# Patient Record
Sex: MALE | Race: WHITE | NOT HISPANIC OR LATINO | Employment: UNEMPLOYED | URBAN - NONMETROPOLITAN AREA
[De-identification: names, ages, dates, MRNs, and addresses within clinical notes are randomized per-mention and may not be internally consistent; named-entity substitution may affect disease eponyms.]

---

## 2019-01-04 ENCOUNTER — OFFICE VISIT (OUTPATIENT)
Dept: FAMILY MEDICINE CLINIC | Facility: CLINIC | Age: 19
End: 2019-01-04

## 2019-01-04 VITALS
SYSTOLIC BLOOD PRESSURE: 124 MMHG | BODY MASS INDEX: 21.5 KG/M2 | HEIGHT: 67 IN | WEIGHT: 137 LBS | RESPIRATION RATE: 14 BRPM | OXYGEN SATURATION: 98 % | HEART RATE: 78 BPM | DIASTOLIC BLOOD PRESSURE: 80 MMHG

## 2019-01-04 DIAGNOSIS — Z23 NEED FOR VACCINATION: ICD-10-CM

## 2019-01-04 DIAGNOSIS — L70.0 ACNE VULGARIS: ICD-10-CM

## 2019-01-04 DIAGNOSIS — Z00.00 ROUTINE GENERAL MEDICAL EXAMINATION AT A HEALTH CARE FACILITY: Primary | ICD-10-CM

## 2019-01-04 DIAGNOSIS — F98.8 ATTENTION DEFICIT DISORDER (ADD) IN ADULT: ICD-10-CM

## 2019-01-04 PROCEDURE — 99385 PREV VISIT NEW AGE 18-39: CPT | Performed by: FAMILY MEDICINE

## 2019-01-04 PROCEDURE — 90471 IMMUNIZATION ADMIN: CPT | Performed by: FAMILY MEDICINE

## 2019-01-04 PROCEDURE — 90686 IIV4 VACC NO PRSV 0.5 ML IM: CPT | Performed by: FAMILY MEDICINE

## 2019-01-04 RX ORDER — BUPROPION HYDROCHLORIDE 150 MG/1
150 TABLET ORAL EVERY MORNING
Qty: 30 TABLET | Refills: 5 | Status: SHIPPED | OUTPATIENT
Start: 2019-01-04 | End: 2019-05-22 | Stop reason: SDUPTHER

## 2019-01-04 RX ORDER — BUPROPION HYDROCHLORIDE 150 MG/1
TABLET ORAL
Refills: 1 | COMMUNITY
Start: 2018-12-11 | End: 2019-01-04 | Stop reason: SDUPTHER

## 2019-01-04 RX ORDER — ADAPALENE 3 MG/G
GEL TOPICAL NIGHTLY
Qty: 45 G | Refills: 5 | Status: SHIPPED | OUTPATIENT
Start: 2019-01-04 | End: 2020-01-07

## 2019-01-04 NOTE — PROGRESS NOTES
New Patient History and Physical      Referring Physician: No ref. provider found    Chief Complaint:    Chief Complaint   Patient presents with   • Establish Care     no current issues need to establish PCP       History of Present Illness:     Patient is a 18-year-old male who is here to establish with a new primary care provider.    Last labs approx 6M ago.  Without known abnml.    Needs refills on several meds for acne.  Previous regimen provided significant improvement to his symptoms.    Also needs refill on bupropion; takes for ADD.  Denies any palpitations, chest pain or dyspnea on exertion.  Sleeping well, good appetite.  Denies any significant weight changes.        Subjective     Review of Systems     1. Constitutional: Negative for fever. Negative for chills, diaphoresis, fatigue and unexpected weight change.   2. HENT: No dysphagia; no changes to vision/hearing/smell/taste; no epistaxis  3. Eyes: Negative for redness and visual disturbance.   4. Respiratory: negative for shortness of breath. Negative for chest pain . Negative for cough and chest tightness.   5. Cardiovascular: Negative for chest pain and palpitations.   6. Gastrointestinal: Negative for abdominal distention, abdominal pain and blood in stool.   7. Endocrine: Negative for cold intolerance and heat intolerance.   8. Genitourinary: Negative for difficulty urinating, dysuria and frequency.   9. Musculoskeletal: Negative for arthralgias, back pain and myalgias.   10. Skin: Acne  11. Neurological: Negative for syncope, weakness and headaches.   12. Hematological: Negative for adenopathy. Does not bruise/bleed easily.   13. Psychiatric/Behavioral: Negative for confusion. The patient is not nervous/anxious.     The following portions of the patient's history were reviewed and updated as appropriate: allergies, current medications, past family history, past medical history, past social history, past surgical history and problem  "list.    Past Medical History: History reviewed. No pertinent past medical history.    Past Surgical History:  Past Surgical History:   Procedure Laterality Date   • TONSILLECTOMY         Family History: family history is not on file. He was adopted.    Social History:  reports that  has never smoked. he has never used smokeless tobacco. He reports that he does not drink alcohol or use drugs.    Medications:    Current Outpatient Medications:   •  buPROPion XL (WELLBUTRIN XL) 150 MG 24 hr tablet, Take 1 tablet by mouth Every Morning., Disp: 30 tablet, Rfl: 5  •  Multiple Vitamin (MULTI-VITAMIN DAILY PO), Take  by mouth., Disp: , Rfl:   •  adapalene (DIFFERIN) 0.3 % gel, Apply  topically to the appropriate area as directed Every Night., Disp: 45 g, Rfl: 5    Allergies:  No Known Allergies    Objective     Physical Exam:  Vital Signs: /80   Pulse 78   Resp 14   Ht 170.2 cm (67\")   Wt 62.1 kg (137 lb)   SpO2 98%   BMI 21.46 kg/m²      General Appearance: alert, oriented x 3, no acute distress.  Well-nourished and developed.  Skin: warm and dry.  Mildly erythematous comedogenic acne, without cystic changes or qualities.  HEENT: Atraumatic.  pupils round and reactive to light and accommodation, oral mucosa pink and moist.  Nares patent without epistaxis.  External auditory canals are patent tympanic membranes intact.  Neck: supple, no JVD, trachea midline.  No thyromegaly  Lungs: CTA, unlabored breathing effort.  Heart: RRR, normal S1 and S2, no S3, no rub.  Abdomen: soft, non-tender, no palpable bladder, present bowel sounds to auscultation ×4.  No guarding or rigidity.  Extremities: no clubbing, cyanosis or edema.  Good range of motion actively and passively.  Symmetric muscle strength and development  Neuro: normal speech and mental status.  Cranial nerves II through XII intact.  No anosmia. DTR 2+; proprioception intact.  No focal motor/sensory deficits.        Assessment / Plan     Assessment:   Gerald " was seen today for establish care.    Diagnoses and all orders for this visit:    Routine general medical examination at a health care facility    Need for vaccination  -     Fluarix/Fluzone/Afluria/FluLaval ([id number])    Attention deficit disorder (ADD) in adult  -     buPROPion XL (WELLBUTRIN XL) 150 MG 24 hr tablet; Take 1 tablet by mouth Every Morning.    Acne vulgaris  -     adapalene (DIFFERIN) 0.3 % gel; Apply  topically to the appropriate area as directed Every Night.        Plan:  Refills given for chronic needed medications today.  Patient elects to get his flu vaccine today additionally.  Blood pressure is at goal.    I have discussed skin care in general with respect to her acne, as well as sun protection.  Discussion Summary:  I have discussed age/gender specific preventative healthcare issues with patient today, I have answered all of his questions.    Discussed plan of care in detail with pt today; pt verb understanding and agrees; counseled for approx 20 min of total 30 min exam time.  Follow up:  Return in about 5 months (around 6/4/2019) for Recheck.     There are no Patient Instructions on file for this visit.    Vega Nielsen DO  01/17/19  10:45 PM    Please note that portions of this note may have been completed with a voice recognition program. Efforts were made to edit the dictations, but occasionally words are mistranscribed.

## 2019-05-22 DIAGNOSIS — F98.8 ATTENTION DEFICIT DISORDER (ADD) IN ADULT: ICD-10-CM

## 2019-05-23 RX ORDER — BUPROPION HYDROCHLORIDE 150 MG/1
150 TABLET ORAL EVERY MORNING
Qty: 30 TABLET | Refills: 5 | Status: SHIPPED | OUTPATIENT
Start: 2019-05-23 | End: 2019-10-23 | Stop reason: SDUPTHER

## 2019-06-07 ENCOUNTER — OFFICE VISIT (OUTPATIENT)
Dept: FAMILY MEDICINE CLINIC | Facility: CLINIC | Age: 19
End: 2019-06-07

## 2019-06-07 VITALS
OXYGEN SATURATION: 99 % | HEIGHT: 67 IN | SYSTOLIC BLOOD PRESSURE: 104 MMHG | RESPIRATION RATE: 14 BRPM | HEART RATE: 68 BPM | DIASTOLIC BLOOD PRESSURE: 62 MMHG | WEIGHT: 134 LBS | BODY MASS INDEX: 21.03 KG/M2

## 2019-06-07 DIAGNOSIS — F98.8 ATTENTION DEFICIT DISORDER (ADD) IN ADULT: Primary | ICD-10-CM

## 2019-06-07 DIAGNOSIS — L70.0 ACNE VULGARIS: ICD-10-CM

## 2019-06-07 PROCEDURE — 99213 OFFICE O/P EST LOW 20 MIN: CPT | Performed by: FAMILY MEDICINE

## 2019-06-07 NOTE — PROGRESS NOTES
Established Patient        Chief Complaint:   Chief Complaint   Patient presents with   • ADD   • Acne        Gerald Ge is a 19 y.o. male    History of Present Illness:   Here for scheduled follow-up visit concerning his attention deficit disorder and acne vulgaris.  Patient states his acne has been very well controlled with use of Differin.  He states his attention deficit has improved considerably with use of bupropion, as well as overall mood stability.  He denies any suicidal or homicidal ideation.  Denies any palpitations, vertigo or syncope.  No complaints of chest pain or orthopnea.  Denies any dysuria or hematuria.    Subjective     The following portions of the patient's history were reviewed and updated as appropriate: allergies, current medications, past family history, past medical history, past social history, past surgical history and problem list.    No Known Allergies    Review of Systems  1. Constitutional: Negative for fever. Negative for chills, diaphoresis, fatigue and unexpected weight change.   2. HENT: No dysphagia; no changes to vision/hearing/smell/taste; no epistaxis  3. Eyes: Negative for redness and visual disturbance.   4. Respiratory: negative for shortness of breath. Negative for chest pain . Negative for cough and chest tightness.   5. Cardiovascular: Negative for chest pain and palpitations.   6. Gastrointestinal: Negative for abdominal distention, abdominal pain and blood in stool.   7. Endocrine: Negative for cold intolerance and heat intolerance.   8. Genitourinary: Negative for difficulty urinating, dysuria and frequency.   9. Musculoskeletal: Negative for arthralgias, back pain and myalgias.   10. Skin: Negative for color change, rash and wound.   11. Neurological: Negative for syncope, weakness and headaches.   12. Hematological: Negative for adenopathy. Does not bruise/bleed easily.   13. Psychiatric/Behavioral: Negative for confusion. The patient is not  "nervous/anxious.    Objective     Physical Exam   Vital Signs: /62   Pulse 68   Resp 14   Ht 170.2 cm (67\")   Wt 60.8 kg (134 lb)   SpO2 99%   BMI 20.99 kg/m²     General Appearance: alert, oriented x 3, no acute distress.  Skin: warm and dry.   HEENT: Atraumatic.  pupils round and reactive to light and accommodation, oral mucosa pink and moist.  Nares patent without epistaxis.  External auditory canals are patent tympanic membranes intact.  Neck: supple, no JVD, trachea midline.  No thyromegaly  Lungs: CTA, unlabored breathing effort.  Heart: RRR, normal S1 and S2, no S3, no rub.  Abdomen: soft, non-tender, no palpable bladder, present bowel sounds to auscultation ×4.  No guarding or rigidity.  Extremities: no clubbing, cyanosis or edema.  Good range of motion actively and passively.  Symmetric muscle strength and development  Neuro: normal speech and mental status.  Cranial nerves II through XII intact.  No anosmia. DTR 2+; proprioception intact.  No focal motor/sensory deficits.    Assessment and Plan      Assessment:   Gerald was seen today for add and acne.    Diagnoses and all orders for this visit:    Attention deficit disorder (ADD) in adult    Acne vulgaris    BMI 21.0-21.9, adult        Plan:  Very pleased with patient's response to current treatment regimen.  Continue bupropion at current dosing.  Also continue Differin use.  Routine skin protection including sunscreen were discussed again with patient today.  Should he need refills on his medications he can contact the office or utilize the my chart capabilities.  Discussion Summary:  Discussed need for stress/anxiety reducing techniques such as prayer/meditation/breathing and counting exercises and avoidance of stress producing environments/situations; will follow clinically.    Discussed plan of care in detail with pt today; pt verb understanding and agrees; counseled for approx 10 min of total 15 min exam time.  Follow up:  Return in about " 7 months (around 1/15/2020) for Annual physical.     There are no Patient Instructions on file for this visit.    Vega Nielsen, DO  06/07/19  11:09 AM    Please note that portions of this note may have been completed with a voice recognition program. Efforts were made to edit the dictations, but occasionally words are mistranscribed.

## 2019-10-23 DIAGNOSIS — F98.8 ATTENTION DEFICIT DISORDER (ADD) IN ADULT: ICD-10-CM

## 2019-10-24 RX ORDER — BUPROPION HYDROCHLORIDE 150 MG/1
150 TABLET ORAL EVERY MORNING
Qty: 90 TABLET | Refills: 1 | Status: SHIPPED | OUTPATIENT
Start: 2019-10-24 | End: 2020-01-07 | Stop reason: SDUPTHER

## 2020-01-07 ENCOUNTER — OFFICE VISIT (OUTPATIENT)
Dept: FAMILY MEDICINE CLINIC | Facility: CLINIC | Age: 20
End: 2020-01-07

## 2020-01-07 VITALS
HEIGHT: 67 IN | HEART RATE: 74 BPM | DIASTOLIC BLOOD PRESSURE: 80 MMHG | WEIGHT: 140 LBS | OXYGEN SATURATION: 98 % | TEMPERATURE: 98.4 F | SYSTOLIC BLOOD PRESSURE: 130 MMHG | BODY MASS INDEX: 21.97 KG/M2

## 2020-01-07 DIAGNOSIS — F98.8 ATTENTION DEFICIT DISORDER (ADD) IN ADULT: ICD-10-CM

## 2020-01-07 DIAGNOSIS — Z00.00 ROUTINE GENERAL MEDICAL EXAMINATION AT A HEALTH CARE FACILITY: Primary | ICD-10-CM

## 2020-01-07 DIAGNOSIS — J45.20 MILD INTERMITTENT REACTIVE AIRWAY DISEASE WITHOUT COMPLICATION: ICD-10-CM

## 2020-01-07 PROBLEM — J45.909 REACTIVE AIRWAY DISEASE: Status: ACTIVE | Noted: 2020-01-07

## 2020-01-07 PROCEDURE — 99395 PREV VISIT EST AGE 18-39: CPT | Performed by: FAMILY MEDICINE

## 2020-01-07 RX ORDER — BUPROPION HYDROCHLORIDE 150 MG/1
150 TABLET ORAL EVERY MORNING
Qty: 90 TABLET | Refills: 2 | Status: SHIPPED | OUTPATIENT
Start: 2020-01-07 | End: 2020-04-06

## 2020-01-07 RX ORDER — ALBUTEROL SULFATE 90 UG/1
2 AEROSOL, METERED RESPIRATORY (INHALATION) EVERY 4 HOURS PRN
Qty: 1 INHALER | Refills: 3 | Status: SHIPPED | OUTPATIENT
Start: 2020-01-07 | End: 2020-07-15

## 2020-01-07 NOTE — PATIENT INSTRUCTIONS
Preventive Care 18-39 Years, Male  Preventive care refers to lifestyle choices and visits with your health care provider that can promote health and wellness.  What does preventive care include?    · A yearly physical exam. This is also called an annual well check.  · Dental exams once or twice a year.  · Routine eye exams. Ask your health care provider how often you should have your eyes checked.  · Personal lifestyle choices, including:  ? Daily care of your teeth and gums.  ? Regular physical activity.  ? Eating a healthy diet.  ? Avoiding tobacco and drug use.  ? Limiting alcohol use.  ? Practicing safe sex.  What happens during an annual well check?  The services and screenings done by your health care provider during your annual well check will depend on your age, overall health, lifestyle risk factors, and family history of disease.  Counseling  Your health care provider may ask you questions about your:  · Alcohol use.  · Tobacco use.  · Drug use.  · Emotional well-being.  · Home and relationship well-being.  · Sexual activity.  · Eating habits.  · Work and work environment.  Screening  You may have the following tests or measurements:  · Height, weight, and BMI.  · Blood pressure.  · Lipid and cholesterol levels. These may be checked every 5 years starting at age 20.  · Diabetes screening. This is done by checking your blood sugar (glucose) after you have not eaten for a while (fasting).  · Skin check.  · Hepatitis C blood test.  · Hepatitis B blood test.  · Sexually transmitted disease (STD) testing.  Discuss your test results, treatment options, and if necessary, the need for more tests with your health care provider.  Vaccines  Your health care provider may recommend certain vaccines, such as:  · Influenza vaccine. This is recommended every year.  · Tetanus, diphtheria, and acellular pertussis (Tdap, Td) vaccine. You may need a Td booster every 10 years.  · Varicella vaccine. You may need this if you  have not been vaccinated.  · HPV vaccine. If you are 26 or younger, you may need three doses over 6 months.  · Measles, mumps, and rubella (MMR) vaccine. You may need at least one dose of MMR. You may also need a second dose.  · Pneumococcal 13-valent conjugate (PCV13) vaccine. You may need this if you have certain conditions and have not been vaccinated.  · Pneumococcal polysaccharide (PPSV23) vaccine. You may need one or two doses if you smoke cigarettes or if you have certain conditions.  · Meningococcal vaccine. One dose is recommended if you are age 19-21 years and a first-year college student living in a residence [place], or if you have one of several medical conditions. You may also need additional booster doses.  · Hepatitis A vaccine. You may need this if you have certain conditions or if you travel or work in places where you may be exposed to hepatitis A.  · Hepatitis B vaccine. You may need this if you have certain conditions or if you travel or work in places where you may be exposed to hepatitis B.  · Haemophilus influenzae type b (Hib) vaccine. You may need this if you have certain risk factors.  Talk to your health care provider about which screenings and vaccines you need and how often you need them.  This information is not intended to replace advice given to you by your health care provider. Make sure you discuss any questions you have with your health care provider.  Document Released: 02/13/2003 Document Revised: 07/31/2018 Document Reviewed: 10/18/2016  [employer] Interactive Patient Education © 2019 [employer]    Asthma, Adult    Asthma is a long-term (chronic) condition in which the airways get tight and narrow. The airways are the breathing passages that lead from the nose and mouth down into the lungs. A person with asthma will have times when symptoms get worse. These are called asthma attacks. They can cause coughing, whistling sounds when you breathe (wheezing), shortness of breath, and  chest pain. They can make it hard to breathe. There is no cure for asthma, but medicines and lifestyle changes can help control it.  There are many things that can bring on an asthma attack or make asthma symptoms worse (triggers). Common triggers include:  · Mold.  · Dust.  · Cigarette smoke.  · Cockroaches.  · Things that can cause allergy symptoms (allergens). These include animal skin flakes (dander) and pollen from trees or grass.  · Things that pollute the air. These may include household , wood smoke, smog, or chemical odors.  · Cold air, weather changes, and wind.  · Crying or laughing hard.  · Stress.  · Certain medicines or drugs.  · Certain foods such as dried fruit, potato chips, and grape juice.  · Infections, such as a cold or the flu.  · Certain medical conditions or diseases.  · Exercise or tiring activities.  Asthma may be treated with medicines and by staying away from the things that cause asthma attacks. Types of medicines may include:  · Controller medicines. These help prevent asthma symptoms. They are usually taken every day.  · Fast-acting reliever or rescue medicines. These quickly relieve asthma symptoms. They are used as needed and provide short-term relief.  · Allergy medicines if your attacks are brought on by allergens.  · Medicines to help control the body's defense (immune) system.  Follow these instructions at home:  Avoiding triggers in your home  · Change your heating and air conditioning filter often.  · Limit your use of fireplaces and wood stoves.  · Get rid of pests (such as roaches and mice) and their droppings.  · Throw away plants if you see mold on them.  · Clean your floors. Dust regularly. Use cleaning products that do not smell.  · Have someone vacuum when you are not home. Use a vacuum  with a HEPA filter if possible.  · Replace carpet with wood, tile, or vinyl shaji. Carpet can trap animal skin flakes and dust.  · Use allergy-proof pillows, mattress  covers, and box spring covers.  · Wash bed sheets and blankets every week in hot water. Dry them in a dryer.  · Keep your bedroom free of any triggers.   · Avoid pets and keep windows closed when things that cause allergy symptoms are in the air.  · Use blankets that are made of polyester or cotton.  · Clean bathrooms and nilda with bleach. If possible, have someone repaint the walls in these rooms with mold-resistant paint. Keep out of the rooms that are being cleaned and painted.  · Wash your hands often with soap and water. If soap and water are not available, use hand .  · Do not allow anyone to smoke in your home.  General instructions  · Take over-the-counter and prescription medicines only as told by your doctor.  ? Talk with your doctor if you have questions about how or when to take your medicines.  ? Make note if you need to use your medicines more often than usual.  · Do not use any products that contain nicotine or tobacco, such as cigarettes and e-cigarettes. If you need help quitting, ask your doctor.  · Stay away from secondhand smoke.  · Avoid doing things outdoors when allergen counts are high and when air quality is low.  · Wear a ski mask when doing outdoor activities in the winter. The mask should cover your nose and mouth. Exercise indoors on cold days if you can.  · Warm up before you exercise. Take time to cool down after exercise.  · Use a peak flow meter as told by your doctor. A peak flow meter is a tool that measures how well the lungs are working.  · Keep track of the peak flow meter's readings. Write them down.  · Follow your asthma action plan. This is a written plan for taking care of your asthma and treating your attacks.  · Make sure you get all the shots (vaccines) that your doctor recommends. Ask your doctor about a flu shot and a pneumonia shot.  · Keep all follow-up visits as told by your doctor. This is important.  Contact a doctor if:  · You have wheezing, shortness  of breath, or a cough even while taking medicine to prevent attacks.  · The mucus you cough up (sputum) is thicker than usual.  · The mucus you cough up changes from clear or white to yellow, green, gray, or bloody.  · You have problems from the medicine you are taking, such as:  ? A rash.  ? Itching.  ? Swelling.  ? Trouble breathing.  · You need reliever medicines more than 2-3 times a week.  · Your peak flow reading is still at 50-79% of your personal best after following the action plan for 1 hour.  · You have a fever.  Get help right away if:  · You seem to be worse and are not responding to medicine during an asthma attack.  · You are short of breath even at rest.  · You get short of breath when doing very little activity.  · You have trouble eating, drinking, or talking.  · You have chest pain or tightness.  · You have a fast heartbeat.  · Your lips or fingernails start to turn blue.  · You are light-headed or dizzy, or you faint.  · Your peak flow is less than 50% of your personal best.  · You feel too tired to breathe normally.  Summary  · Asthma is a long-term (chronic) condition in which the airways get tight and narrow. An asthma attack can make it hard to breathe.  · Asthma cannot be cured, but medicines and lifestyle changes can help control it.  · Make sure you understand how to avoid triggers and how and when to use your medicines.  This information is not intended to replace advice given to you by your health care provider. Make sure you discuss any questions you have with your health care provider.  Document Released: 06/05/2009 Document Revised: 01/22/2018 Document Reviewed: 01/22/2018  [employer] Interactive Patient Education © 2019 [employer]

## 2020-01-07 NOTE — PROGRESS NOTES
Established Patient        Chief Complaint:   Chief Complaint   Patient presents with   • Follow-up     ADHD; med refills        Gerald Ge is a 19 y.o. male    History of Present Illness:   Here for scheduled annual preventative healthcare exam.  He denies any new complaints today.  He maintains an active lifestyle, no dyspnea on exertion or chest pain.  Denies any fever or chills.  No significant weight changes.  He states that his mood stability has been excellent, very good response thus far to continued use of bupropion.  He does not wish to change the dose today.    Patient does admit to periodic episodes of coughing, describing difficulty exhaling.  He describes some associated cough and wheezing as well.  He states the symptoms can last from just less than an hour to several hours in duration.  He denies any known irritants or contributing factors.  Denies any hemoptysis.  Denies any night sweats.  He denies any aspiration.  No complaints of dysphagia.    Subjective     The following portions of the patient's history were reviewed and updated as appropriate: allergies, current medications, past family history, past medical history, past social history, past surgical history and problem list.    No Known Allergies    Review of Systems  1. Constitutional: Negative for fever. Negative for chills, diaphoresis, fatigue and unexpected weight change.   2. HENT: No dysphagia; no changes to vision/hearing/smell/taste; no epistaxis  3. Eyes: Negative for redness and visual disturbance.   4. Respiratory: negative for shortness of breath. Negative for chest pain . Negative for cough and chest tightness.   5. Cardiovascular: Negative for chest pain and palpitations.   6. Gastrointestinal: Negative for abdominal distention, abdominal pain and blood in stool.   7. Endocrine: Negative for cold intolerance and heat intolerance.   8. Genitourinary: Negative for difficulty urinating, dysuria and frequency.  "  9. Musculoskeletal: Negative for arthralgias, back pain and myalgias.   10. Skin: Negative for color change, rash and wound.   11. Neurological: Negative for syncope, weakness and headaches.   12. Hematological: Negative for adenopathy. Does not bruise/bleed easily.   13. Psychiatric/Behavioral: Negative for confusion. The patient is not nervous/anxious.    Objective     Physical Exam   Vital Signs: /80   Pulse 74   Temp 98.4 °F (36.9 °C)   Ht 170.2 cm (67\")   Wt 63.5 kg (140 lb)   SpO2 98%   BMI 21.93 kg/m²     General Appearance: alert, oriented x 3, no acute distress.  Skin: warm and dry.   HEENT: Atraumatic.  pupils round and reactive to light and accommodation, oral mucosa pink and moist.  Nares patent without epistaxis.  External auditory canals are patent tympanic membranes intact.  Neck: supple, no JVD, trachea midline.  No thyromegaly  Lungs: CTA, unlabored breathing effort.  Heart: RRR, normal S1 and S2, no S3, no rub.  Abdomen: soft, non-tender, no palpable bladder, present bowel sounds to auscultation ×4.  No guarding or rigidity.  Extremities: no clubbing, cyanosis or edema.  Good range of motion actively and passively.  Symmetric muscle strength and development  Neuro: normal speech and mental status.  Cranial nerves II through XII intact.  No anosmia. DTR 2+; proprioception intact.  No focal motor/sensory deficits.    Assessment and Plan      Assessment:   Gerald was seen today for follow-up.    Diagnoses and all orders for this visit:    Routine general medical examination at a health care facility  -     CBC & Differential; Future  -     Comprehensive Metabolic Panel; Future  -     Lipid Panel; Future    Attention deficit disorder (ADD) in adult  -     buPROPion XL (WELLBUTRIN XL) 150 MG 24 hr tablet; Take 1 tablet by mouth Every Morning.    Mild intermittent reactive airway disease without complication  -     albuterol sulfate  (90 Base) MCG/ACT inhaler; Inhale 2 puffs Every 4 " (Four) Hours As Needed for Wheezing or Shortness of Air.        Plan:  Suspect patient is suffering from reactive airway changes, likely brought on by seasonal changes and environmental irritants.  I recommended that he utilize an as needed short acting bronchodilator.  He verbalized understanding of the correct technique and demonstrated such today.  He will use on an as-needed basis.    Routine surveillance labs for preventative healthcare needs today including CBC/CMP and lipid panel.    Refill provided for bupropion today.  Response thus far has been excellent.  Discussion Summary:  I have discussed age/gender specific preventative healthcare issues in detail with patient today.  I have answered all of the questions.      Discussed plan of care in detail with pt today; pt verb understanding and agrees.  Follow up:  No follow-ups on file.     Patient Instructions       Preventive Care 18-39 Years, Male  Preventive care refers to lifestyle choices and visits with your health care provider that can promote health and wellness.  What does preventive care include?    · A yearly physical exam. This is also called an annual well check.  · Dental exams once or twice a year.  · Routine eye exams. Ask your health care provider how often you should have your eyes checked.  · Personal lifestyle choices, including:  ? Daily care of your teeth and gums.  ? Regular physical activity.  ? Eating a healthy diet.  ? Avoiding tobacco and drug use.  ? Limiting alcohol use.  ? Practicing safe sex.  What happens during an annual well check?  The services and screenings done by your health care provider during your annual well check will depend on your age, overall health, lifestyle risk factors, and family history of disease.  Counseling  Your health care provider may ask you questions about your:  · Alcohol use.  · Tobacco use.  · Drug use.  · Emotional well-being.  · Home and relationship well-being.  · Sexual activity.  · Eating  habits.  · Work and work environment.  Screening  You may have the following tests or measurements:  · Height, weight, and BMI.  · Blood pressure.  · Lipid and cholesterol levels. These may be checked every 5 years starting at age 20.  · Diabetes screening. This is done by checking your blood sugar (glucose) after you have not eaten for a while (fasting).  · Skin check.  · Hepatitis C blood test.  · Hepatitis B blood test.  · Sexually transmitted disease (STD) testing.  Discuss your test results, treatment options, and if necessary, the need for more tests with your health care provider.  Vaccines  Your health care provider may recommend certain vaccines, such as:  · Influenza vaccine. This is recommended every year.  · Tetanus, diphtheria, and acellular pertussis (Tdap, Td) vaccine. You may need a Td booster every 10 years.  · Varicella vaccine. You may need this if you have not been vaccinated.  · HPV vaccine. If you are 26 or younger, you may need three doses over 6 months.  · Measles, mumps, and rubella (MMR) vaccine. You may need at least one dose of MMR. You may also need a second dose.  · Pneumococcal 13-valent conjugate (PCV13) vaccine. You may need this if you have certain conditions and have not been vaccinated.  · Pneumococcal polysaccharide (PPSV23) vaccine. You may need one or two doses if you smoke cigarettes or if you have certain conditions.  · Meningococcal vaccine. One dose is recommended if you are age 19-21 years and a first-year college student living in a residence [place], or if you have one of several medical conditions. You may also need additional booster doses.  · Hepatitis A vaccine. You may need this if you have certain conditions or if you travel or work in places where you may be exposed to hepatitis A.  · Hepatitis B vaccine. You may need this if you have certain conditions or if you travel or work in places where you may be exposed to hepatitis B.  · Haemophilus influenzae type b (Hib)  vaccine. You may need this if you have certain risk factors.  Talk to your health care provider about which screenings and vaccines you need and how often you need them.  This information is not intended to replace advice given to you by your health care provider. Make sure you discuss any questions you have with your health care provider.  Document Released: 02/13/2003 Document Revised: 07/31/2018 Document Reviewed: 10/18/2016  [employer] Interactive Patient Education © 2019 [employer]    Asthma, Adult    Asthma is a long-term (chronic) condition in which the airways get tight and narrow. The airways are the breathing passages that lead from the nose and mouth down into the lungs. A person with asthma will have times when symptoms get worse. These are called asthma attacks. They can cause coughing, whistling sounds when you breathe (wheezing), shortness of breath, and chest pain. They can make it hard to breathe. There is no cure for asthma, but medicines and lifestyle changes can help control it.  There are many things that can bring on an asthma attack or make asthma symptoms worse (triggers). Common triggers include:  · Mold.  · Dust.  · Cigarette smoke.  · Cockroaches.  · Things that can cause allergy symptoms (allergens). These include animal skin flakes (dander) and pollen from trees or grass.  · Things that pollute the air. These may include household , wood smoke, smog, or chemical odors.  · Cold air, weather changes, and wind.  · Crying or laughing hard.  · Stress.  · Certain medicines or drugs.  · Certain foods such as dried fruit, potato chips, and grape juice.  · Infections, such as a cold or the flu.  · Certain medical conditions or diseases.  · Exercise or tiring activities.  Asthma may be treated with medicines and by staying away from the things that cause asthma attacks. Types of medicines may include:  · Controller medicines. These help prevent asthma symptoms. They are usually taken  every day.  · Fast-acting reliever or rescue medicines. These quickly relieve asthma symptoms. They are used as needed and provide short-term relief.  · Allergy medicines if your attacks are brought on by allergens.  · Medicines to help control the body's defense (immune) system.  Follow these instructions at home:  Avoiding triggers in your home  · Change your heating and air conditioning filter often.  · Limit your use of fireplaces and wood stoves.  · Get rid of pests (such as roaches and mice) and their droppings.  · Throw away plants if you see mold on them.  · Clean your floors. Dust regularly. Use cleaning products that do not smell.  · Have someone vacuum when you are not home. Use a vacuum  with a HEPA filter if possible.  · Replace carpet with wood, tile, or vinyl shaji. Carpet can trap animal skin flakes and dust.  · Use allergy-proof pillows, mattress covers, and box spring covers.  · Wash bed sheets and blankets every week in hot water. Dry them in a dryer.  · Keep your bedroom free of any triggers.   · Avoid pets and keep windows closed when things that cause allergy symptoms are in the air.  · Use blankets that are made of polyester or cotton.  · Clean bathrooms and nilda with bleach. If possible, have someone repaint the walls in these rooms with mold-resistant paint. Keep out of the rooms that are being cleaned and painted.  · Wash your hands often with soap and water. If soap and water are not available, use hand .  · Do not allow anyone to smoke in your home.  General instructions  · Take over-the-counter and prescription medicines only as told by your doctor.  ? Talk with your doctor if you have questions about how or when to take your medicines.  ? Make note if you need to use your medicines more often than usual.  · Do not use any products that contain nicotine or tobacco, such as cigarettes and e-cigarettes. If you need help quitting, ask your doctor.  · Stay away from  secondhand smoke.  · Avoid doing things outdoors when allergen counts are high and when air quality is low.  · Wear a ski mask when doing outdoor activities in the winter. The mask should cover your nose and mouth. Exercise indoors on cold days if you can.  · Warm up before you exercise. Take time to cool down after exercise.  · Use a peak flow meter as told by your doctor. A peak flow meter is a tool that measures how well the lungs are working.  · Keep track of the peak flow meter's readings. Write them down.  · Follow your asthma action plan. This is a written plan for taking care of your asthma and treating your attacks.  · Make sure you get all the shots (vaccines) that your doctor recommends. Ask your doctor about a flu shot and a pneumonia shot.  · Keep all follow-up visits as told by your doctor. This is important.  Contact a doctor if:  · You have wheezing, shortness of breath, or a cough even while taking medicine to prevent attacks.  · The mucus you cough up (sputum) is thicker than usual.  · The mucus you cough up changes from clear or white to yellow, green, gray, or bloody.  · You have problems from the medicine you are taking, such as:  ? A rash.  ? Itching.  ? Swelling.  ? Trouble breathing.  · You need reliever medicines more than 2-3 times a week.  · Your peak flow reading is still at 50-79% of your personal best after following the action plan for 1 hour.  · You have a fever.  Get help right away if:  · You seem to be worse and are not responding to medicine during an asthma attack.  · You are short of breath even at rest.  · You get short of breath when doing very little activity.  · You have trouble eating, drinking, or talking.  · You have chest pain or tightness.  · You have a fast heartbeat.  · Your lips or fingernails start to turn blue.  · You are light-headed or dizzy, or you faint.  · Your peak flow is less than 50% of your personal best.  · You feel too tired to breathe  normally.  Summary  · Asthma is a long-term (chronic) condition in which the airways get tight and narrow. An asthma attack can make it hard to breathe.  · Asthma cannot be cured, but medicines and lifestyle changes can help control it.  · Make sure you understand how to avoid triggers and how and when to use your medicines.  This information is not intended to replace advice given to you by your health care provider. Make sure you discuss any questions you have with your health care provider.  Document Released: 06/05/2009 Document Revised: 01/22/2018 Document Reviewed: 01/22/2018  [employer] Interactive Patient Education © 2019 [employer]        Vega Nielsen DO  01/07/20  8:50 AM          Please note that portions of this note may have been completed with a voice recognition program. Efforts were made to edit the dictations, but occasionally words are mistranscribed.

## 2020-01-08 ENCOUNTER — RESULTS ENCOUNTER (OUTPATIENT)
Dept: FAMILY MEDICINE CLINIC | Facility: CLINIC | Age: 20
End: 2020-01-08

## 2020-01-08 DIAGNOSIS — Z00.00 ROUTINE GENERAL MEDICAL EXAMINATION AT A HEALTH CARE FACILITY: ICD-10-CM

## 2020-04-05 DIAGNOSIS — F98.8 ATTENTION DEFICIT DISORDER (ADD) IN ADULT: ICD-10-CM

## 2020-04-06 RX ORDER — BUPROPION HYDROCHLORIDE 150 MG/1
150 TABLET ORAL EVERY MORNING
Qty: 90 TABLET | Refills: 1 | Status: SHIPPED | OUTPATIENT
Start: 2020-04-06 | End: 2020-07-07

## 2020-07-07 ENCOUNTER — OFFICE VISIT (OUTPATIENT)
Dept: FAMILY MEDICINE CLINIC | Facility: CLINIC | Age: 20
End: 2020-07-07

## 2020-07-07 VITALS
HEIGHT: 67 IN | WEIGHT: 143 LBS | OXYGEN SATURATION: 98 % | SYSTOLIC BLOOD PRESSURE: 120 MMHG | DIASTOLIC BLOOD PRESSURE: 70 MMHG | HEART RATE: 66 BPM | TEMPERATURE: 99.6 F | BODY MASS INDEX: 22.44 KG/M2

## 2020-07-07 DIAGNOSIS — F98.8 ATTENTION DEFICIT DISORDER (ADD) IN ADULT: Primary | ICD-10-CM

## 2020-07-07 PROCEDURE — 99213 OFFICE O/P EST LOW 20 MIN: CPT | Performed by: FAMILY MEDICINE

## 2020-07-07 RX ORDER — BUPROPION HYDROCHLORIDE 100 MG/1
100 TABLET, EXTENDED RELEASE ORAL EVERY MORNING
Qty: 30 TABLET | Refills: 0 | Status: SHIPPED | OUTPATIENT
Start: 2020-07-07 | End: 2020-07-30

## 2020-07-07 NOTE — PROGRESS NOTES
Established Patient        Chief Complaint:   Chief Complaint   Patient presents with   • ADD     follow up; would like to discuss medications        Gerald Ge is a 20 y.o. male    History of Present Illness:   Patient, here today accompanied by his father, for evaluation of his known attention deficit disorder.  No reports of any chest pain or palpitations.  Denies any suicidal homicidal ideation.  Denies any dyspnea on exertion.  Maintains an active lifestyle, no reports of any fever, chills or night sweats.  Patient is interesting in potentially titrating off of bupropion, currently utilizing the XL formulation.    Subjective     The following portions of the patient's history were reviewed and updated as appropriate: allergies, current medications, past family history, past medical history, past social history, past surgical history and problem list.    No Known Allergies    Review of Systems  1. Constitutional: Negative for fever. Negative for chills, diaphoresis, fatigue and unexpected weight change.   2. HENT: No dysphagia; no changes to vision/hearing/smell/taste; no epistaxis  3. Eyes: Negative for redness and visual disturbance.   4. Respiratory: negative for shortness of breath. Negative for chest pain . Negative for cough and chest tightness.   5. Cardiovascular: Negative for chest pain and palpitations.   6. Gastrointestinal: Negative for abdominal distention, abdominal pain and blood in stool.   7. Endocrine: Negative for cold intolerance and heat intolerance.   8. Genitourinary: Negative for difficulty urinating, dysuria and frequency.   9. Musculoskeletal: Negative for arthralgias, back pain and myalgias.   10. Skin: Negative for color change, rash and wound.   11. Neurological: Negative for syncope, weakness and headaches.   12. Hematological: Negative for adenopathy. Does not bruise/bleed easily.   13. Psychiatric/Behavioral: Negative for confusion. The patient is not  "nervous/anxious.    Objective     Physical Exam   Vital Signs: /70   Pulse 66   Temp 99.6 °F (37.6 °C)   Ht 170.2 cm (67\")   Wt 64.9 kg (143 lb)   SpO2 98%   BMI 22.40 kg/m²     General Appearance: alert, oriented x 3, no acute distress.  Skin: warm and dry.   HEENT: Atraumatic.  pupils round and reactive to light and accommodation, oral mucosa pink and moist.  Nares patent without epistaxis.  External auditory canals are patent tympanic membranes intact.  Neck: supple, no JVD, trachea midline.  No thyromegaly  Lungs: CTA, unlabored breathing effort.  Heart: RRR, normal S1 and S2, no S3, no rub.  Abdomen: soft, non-tender, no palpable bladder, present bowel sounds to auscultation ×4.  No guarding or rigidity.  Extremities: no clubbing, cyanosis or edema.  Good range of motion actively and passively.  Symmetric muscle strength and development  Neuro: normal speech and mental status.  Cranial nerves II through XII intact.  No anosmia. DTR 2+; proprioception intact.  No focal motor/sensory deficits.    Assessment and Plan      Assessment:   Gerald was seen today for add.    Diagnoses and all orders for this visit:    Attention deficit disorder (ADD) in adult  -     buPROPion SR (WELLBUTRIN SR) 100 MG 12 hr tablet; Take 1 tablet by mouth Every Morning.        Plan:  I recommended a transition to the SR formulation, 12-hour, of bupropion.  He will take this daily for the next planned to to 3 weeks.  At such time he can begin attempts at discontinuation of the medication.  I have recommended a trial of every other day dosing at that time, they plan to keep me updated to his response with this change.  Discussion Summary:    Discussed plan of care in detail with pt today; pt verb understanding and agrees.  Follow up:  Return in about 3 months (around 10/7/2020) for Recheck, Med Change/New Meds.     There are no Patient Instructions on file for this visit.    Vega Nielsen DO  07/07/20  09:01          Please " note that portions of this note may have been completed with a voice recognition program. Efforts were made to edit the dictations, but occasionally words are mistranscribed.

## 2020-07-24 ENCOUNTER — TELEPHONE (OUTPATIENT)
Dept: FAMILY MEDICINE CLINIC | Facility: CLINIC | Age: 20
End: 2020-07-24

## 2020-07-24 NOTE — TELEPHONE ENCOUNTER
PATIENT CALLED ABOUT TRYING TO DECREASE / STOP THE WELLBUTRIN. HE SAID THAT ABOUT A WEEK AGO HE STARTED TAKING THE WELLBUTRIN SR DAILY, THIS WEEK HE HAS BEEN TAKING EVERY OTHER DAY. HE SAID THAT HE IS NOT DOING WELL WITH THE DECREASE, HE IS HAVING MORE FELLING'S OF SADNESS, ANXIOUS AND JUST NOT HIMSELF. WANTED TO KNOW WHAT HE NEEDS TO DO, IF HE SHOULD GO BACK TO TAKING IT DAILY, SAID THAT HE WAS OK THEN. PLEASE CALL HIM BACK [phone]

## 2020-07-29 NOTE — TELEPHONE ENCOUNTER
Please have patient continue taking the Wellbutrin daily.  If he is agreeable, we can place a referral for him to be seen by behavioral health additionally here at the office.

## 2020-07-30 DIAGNOSIS — F98.8 ATTENTION DEFICIT DISORDER (ADD) IN ADULT: ICD-10-CM

## 2020-07-30 RX ORDER — BUPROPION HYDROCHLORIDE 100 MG/1
TABLET, EXTENDED RELEASE ORAL
Qty: 30 TABLET | Refills: 0 | Status: SHIPPED | OUTPATIENT
Start: 2020-07-30 | End: 2020-09-17

## 2020-09-17 ENCOUNTER — OFFICE VISIT (OUTPATIENT)
Dept: FAMILY MEDICINE CLINIC | Facility: CLINIC | Age: 20
End: 2020-09-17

## 2020-09-17 VITALS
TEMPERATURE: 98.7 F | HEART RATE: 67 BPM | WEIGHT: 145 LBS | HEIGHT: 67 IN | OXYGEN SATURATION: 98 % | SYSTOLIC BLOOD PRESSURE: 110 MMHG | DIASTOLIC BLOOD PRESSURE: 64 MMHG | BODY MASS INDEX: 22.76 KG/M2

## 2020-09-17 DIAGNOSIS — F98.8 ATTENTION DEFICIT DISORDER (ADD) IN ADULT: Primary | ICD-10-CM

## 2020-09-17 PROCEDURE — 99213 OFFICE O/P EST LOW 20 MIN: CPT | Performed by: FAMILY MEDICINE

## 2020-09-17 RX ORDER — METHYLPHENIDATE HYDROCHLORIDE 18 MG/1
18 TABLET, EXTENDED RELEASE ORAL EVERY MORNING
Qty: 30 TABLET | Refills: 0 | Status: SHIPPED | OUTPATIENT
Start: 2020-09-17 | End: 2020-10-15 | Stop reason: SDUPTHER

## 2020-09-17 NOTE — PROGRESS NOTES
Established Patient        Chief Complaint:   Chief Complaint   Patient presents with   • Follow-up     ADD; unable to tolerate Wellbutrin; worsened depression and ADD        Gerald Ge is a 20 y.o. male    History of Present Illness:   Here for scheduled follow-up visit concerning his attention deficit disorder.  He does have a history of generalized anxiety disorder.  His mother is always felt that it was associated with his hyperactivity of his attention deficit disorder additionally.  He denies any SI/HI.  He has discontinued use of bupropion completely.  Patient states that his symptoms have worsened of both anxiety and attention deficit since that time.  He has had a significant increase in his appetite and noticeable weight gain.  Denies any problems with his sleep at this time.  He continues to have difficulty with concentration and staying on task.  He frequently loses train of thought.    Subjective     The following portions of the patient's history were reviewed and updated as appropriate: allergies, current medications, past family history, past medical history, past social history, past surgical history and problem list.    Allergies   Allergen Reactions   • Wellbutrin [Bupropion] Mental Status Change       Review of Systems  1. Constitutional: Negative for fever. Negative for chills, diaphoresis, fatigue and unexpected weight change.   2. HENT: No dysphagia; no changes to vision/hearing/smell/taste; no epistaxis  3. Eyes: Negative for redness and visual disturbance.   4. Respiratory: negative for shortness of breath. Negative for chest pain . Negative for cough and chest tightness.   5. Cardiovascular: Negative for chest pain and palpitations.   6. Gastrointestinal: Negative for abdominal distention, abdominal pain and blood in stool.   7. Endocrine: Negative for cold intolerance and heat intolerance.   8. Genitourinary: Negative for difficulty urinating, dysuria and frequency.  "  9. Musculoskeletal: Negative for arthralgias, back pain and myalgias.   10. Skin: Negative for color change, rash and wound.   11. Neurological: Negative for syncope, weakness and headaches.   12. Hematological: Negative for adenopathy. Does not bruise/bleed easily.   13. Psychiatric/Behavioral: Negative for confusion. The patient is not nervous/anxious.    Objective     Physical Exam   Vital Signs: /64   Pulse 67   Temp 98.7 °F (37.1 °C)   Ht 170.2 cm (67\")   Wt 65.8 kg (145 lb)   SpO2 98%   BMI 22.71 kg/m²     General Appearance: alert, oriented x 3, no acute distress.  Skin: warm and dry.   HEENT: Atraumatic.  pupils round and reactive to light and accommodation, oral mucosa pink and moist.  Nares patent without epistaxis.  External auditory canals are patent tympanic membranes intact.  Neck: supple, no JVD, trachea midline.  No thyromegaly  Lungs: CTA, unlabored breathing effort.  Heart: RRR, normal S1 and S2, no S3, no rub.  Abdomen: soft, non-tender, no palpable bladder, present bowel sounds to auscultation ×4.  No guarding or rigidity.  Extremities: no clubbing, cyanosis or edema.  Good range of motion actively and passively.  Symmetric muscle strength and development  Neuro: normal speech and mental status.  Cranial nerves II through XII intact.  No anosmia. DTR 2+; proprioception intact.  No focal motor/sensory deficits.    Assessment and Plan      Assessment:   Gerald was seen today for follow-up.    Diagnoses and all orders for this visit:    Attention deficit disorder (ADD) in adult  -     Methylphenidate HCl ER 18 MG CR tablet; Take 1 tablet by mouth Every Morning.        Plan:  I have recommended starting Concerta 18 mg dosing, patient verbalized understanding of the mechanism of action of the medication.  Should he develop any ill effects he is notify the office immediately for potential change in treatment regimen.  I have asked that he continue to maintain healthy dietary choices, as " well as strive to achieve appropriate sleep.  Maintain appropriate hydration status additionally.  Discussion Summary:    Discussed plan of care in detail with pt today; pt verb understanding and agrees.  Follow up:  Return in about 4 weeks (around 10/15/2020) for Recheck, Med Change/New Meds.     There are no Patient Instructions on file for this visit.    Vega Nielsen,   09/17/20  09:50 EDT          Please note that portions of this note may have been completed with a voice recognition program. Efforts were made to edit the dictations, but occasionally words are mistranscribed.

## 2020-10-15 DIAGNOSIS — F98.8 ATTENTION DEFICIT DISORDER (ADD) IN ADULT: ICD-10-CM

## 2020-10-15 RX ORDER — METHYLPHENIDATE HYDROCHLORIDE 18 MG/1
18 TABLET, EXTENDED RELEASE ORAL EVERY MORNING
Qty: 30 TABLET | Refills: 0 | Status: SHIPPED | OUTPATIENT
Start: 2020-10-15 | End: 2020-11-17 | Stop reason: SDUPTHER

## 2020-10-15 NOTE — TELEPHONE ENCOUNTER
PTS MOTHER CALLED STATING SHE HAS TESTED POSITIVE FOR COVID    PT LIVES WITH PARENT    SHE IS CANCELED APPOINTMENT AND IS REQUESTING A REFILL FOR:  CONCERTA    [id number] - [place], [place] - [address] [facility] [phone] [facility] [phone] FX

## 2020-11-17 DIAGNOSIS — F98.8 ATTENTION DEFICIT DISORDER (ADD) IN ADULT: ICD-10-CM

## 2020-11-17 NOTE — TELEPHONE ENCOUNTER
Caller: Gerald Ge    Relationship: Self    Best call back number:[phone]    Medication needed:   Requested Prescriptions     Pending Prescriptions Disp Refills   • Methylphenidate HCl ER 18 MG CR tablet 30 tablet 0     Sig: Take 1 tablet by mouth Every Morning.       When do you need the refill by: 11/19/20    What details did the patient provide when requesting the medication:     Does the patient have less than a 3 day supply:  [] Yes  [x] No    What is the patient's preferred pharmacy:      [id number] - [place], [place] - [address] [facility] [address] [phone] [facility] [phone] FX

## 2020-11-20 NOTE — TELEPHONE ENCOUNTER
PATIENT'S FATHER RENATO (ON THE VERBAL) CALLED TO CHECK ON THE STATUS OF MED REFILL BECAUSE THE PATIENT WILL BE OUT AFTER TODAY.    PLEASE CALL AND ADVISE -[phone]

## 2020-11-21 RX ORDER — METHYLPHENIDATE HYDROCHLORIDE 18 MG/1
18 TABLET, EXTENDED RELEASE ORAL EVERY MORNING
Qty: 30 TABLET | Refills: 0 | Status: SHIPPED | OUTPATIENT
Start: 2020-11-21 | End: 2020-12-14 | Stop reason: SDUPTHER

## 2020-12-14 ENCOUNTER — OFFICE VISIT (OUTPATIENT)
Dept: FAMILY MEDICINE CLINIC | Facility: CLINIC | Age: 20
End: 2020-12-14

## 2020-12-14 VITALS
HEART RATE: 82 BPM | OXYGEN SATURATION: 98 % | HEIGHT: 67 IN | WEIGHT: 151 LBS | BODY MASS INDEX: 23.7 KG/M2 | SYSTOLIC BLOOD PRESSURE: 120 MMHG | DIASTOLIC BLOOD PRESSURE: 80 MMHG

## 2020-12-14 DIAGNOSIS — F98.8 ATTENTION DEFICIT DISORDER (ADD) IN ADULT: Primary | ICD-10-CM

## 2020-12-14 PROCEDURE — 99213 OFFICE O/P EST LOW 20 MIN: CPT | Performed by: FAMILY MEDICINE

## 2020-12-14 RX ORDER — METHYLPHENIDATE HYDROCHLORIDE 27 MG/1
27 TABLET, EXTENDED RELEASE ORAL EVERY MORNING
Qty: 30 TABLET | Refills: 0 | Status: SHIPPED | OUTPATIENT
Start: 2020-12-14

## 2020-12-14 NOTE — PROGRESS NOTES
Established Patient        Chief Complaint:   Chief Complaint   Patient presents with   • Follow-up     ADHD; med refill        Gerald Ge is a 20 y.o. male    History of Present Illness:   Here today for scheduled follow-up visit concerning his attention deficit disorder.  He has responded well to Concerta, denies any palpitations, vertigo or syncope.  No chest pain or dyspnea on exertion.  He has maintained a good appetite, in fact has noticed a small weight gain.  He is sleeping well.  He has had some difficulty with consistency with dosing in the morning out of forgetfulness.  He has made significant efforts to improve his consistency with timing of the medicine however, he has definitely noticed a significant improvement overall symptom control since starting.  No reports of any fever, chills or night sweats.  No aspiration or dysphagia.  Denies any orthopnea.    Subjective     The following portions of the patient's history were reviewed and updated as appropriate: allergies, current medications, past family history, past medical history, past social history, past surgical history and problem list.    Allergies   Allergen Reactions   • Wellbutrin [Bupropion] Mental Status Change       Review of Systems  1. Constitutional: Negative for fever. Negative for chills, diaphoresis, fatigue and unexpected weight change.   2. HENT: No dysphagia; no changes to vision/hearing/smell/taste; no epistaxis  3. Eyes: Negative for redness and visual disturbance.   4. Respiratory: negative for shortness of breath. Negative for chest pain . Negative for cough and chest tightness.   5. Cardiovascular: Negative for chest pain and palpitations.   6. Gastrointestinal: Negative for abdominal distention, abdominal pain and blood in stool.   7. Endocrine: Negative for cold intolerance and heat intolerance.   8. Genitourinary: Negative for difficulty urinating, dysuria and frequency.   9. Musculoskeletal: Negative for arthralgias,  "back pain and myalgias.   10. Skin: Negative for color change, rash and wound.   11. Neurological: Negative for syncope, weakness and headaches.   12. Hematological: Negative for adenopathy. Does not bruise/bleed easily.   13. Psychiatric/Behavioral: Negative for confusion. The patient is not nervous/anxious.    Objective     Physical Exam   Vital Signs: /80   Pulse 82   Ht 170.2 cm (67\")   Wt 68.5 kg (151 lb)   SpO2 98%   BMI 23.65 kg/m²     General Appearance: alert, oriented x 3, no acute distress.  Skin: warm and dry.   HEENT: Atraumatic.  pupils round and reactive to light and accommodation, oral mucosa pink and moist.  Nares patent without epistaxis.  External auditory canals are patent tympanic membranes intact.  Neck: supple, no JVD, trachea midline.  No thyromegaly  Lungs: CTA, unlabored breathing effort.  Heart: RRR, normal S1 and S2, no S3, no rub.  Abdomen: soft, non-tender, no palpable bladder, present bowel sounds to auscultation ×4.  No guarding or rigidity.  Extremities: no clubbing, cyanosis or edema.  Good range of motion actively and passively.  Symmetric muscle strength and development  Neuro: normal speech and mental status.  Cranial nerves II through XII intact.  No anosmia. DTR 2+; proprioception intact.  No focal motor/sensory deficits.    Advance Care Planning   ACP discussion was held with the patient during this visit. Patient does not have an advance directive, information provided.    Assessment and Plan      Assessment:   Diagnoses and all orders for this visit:    1. Attention deficit disorder (ADD) in adult (Primary)  -     Methylphenidate HCl ER 27 MG tablet sustained-release 24 hour; Take 1 tablet by mouth Every Morning.  Dispense: 30 tablet; Refill: 0        Plan:  Planned trial of Inc dose of Concerta to 27 mg daily; will follow clinically.    VSS, HD stable.    Sleeping well.    Discussion Summary:    Discussed plan of care in detail with pt today; pt verb " understanding and agrees.    I have reviewed and updated all copied forward information, as appropriate.  I attest to the accuracy and relevance of any unchanged information.    Follow up:  No follow-ups on file.     There are no Patient Instructions on file for this visit.    Vega Nielsen, DO  12/14/20  08:28 EST          Please note that portions of this note may have been completed with a voice recognition program. Efforts were made to edit the dictations, but occasionally words are mistranscribed.

## 2021-05-10 ENCOUNTER — OFFICE VISIT (OUTPATIENT)
Dept: FAMILY MEDICINE CLINIC | Facility: CLINIC | Age: 21
End: 2021-05-10

## 2021-05-10 VITALS
HEART RATE: 85 BPM | TEMPERATURE: 97.4 F | OXYGEN SATURATION: 98 % | SYSTOLIC BLOOD PRESSURE: 122 MMHG | HEIGHT: 67 IN | WEIGHT: 154 LBS | DIASTOLIC BLOOD PRESSURE: 70 MMHG | BODY MASS INDEX: 24.17 KG/M2

## 2021-05-10 DIAGNOSIS — N63.0 BREAST MASS IN MALE: Primary | ICD-10-CM

## 2021-05-10 PROCEDURE — 99213 OFFICE O/P EST LOW 20 MIN: CPT | Performed by: NURSE PRACTITIONER

## 2021-05-10 NOTE — PROGRESS NOTES
"      Subjective     Chief Complaint:    Chief Complaint   Patient presents with   • Breast Mass     on his chest, brest area, nipple was darker.        History of Present Illness:   He noticed a knot in his left breast tissue. Felt like it was behind his nipple. Area was tender to touch. His nipple was more dark than usual but no redness, streaking, or nipple discharge. No family history cancer. He does drink a lot of caffeine.     Review of Systems  Gen- No fevers, chills  CV- No chest pain, palpitations  Resp- No cough, dyspnea  GI- No N/V/D, abd pain  Neuro-No dizziness, headaches      I have reviewed and/or updated the patient's past medical, surgical, family, social history and problem list as appropriate.     Medications:    Current Outpatient Medications:   •  Methylphenidate HCl ER 27 MG tablet sustained-release 24 hour, Take 1 tablet by mouth Every Morning., Disp: 30 tablet, Rfl: 0    Allergies:  Allergies   Allergen Reactions   • Wellbutrin [Bupropion] Mental Status Change       Objective     Vital Signs:   Vitals:    05/10/21 0932   BP: 122/70   Pulse: 85   Temp: 97.4 °F (36.3 °C)   SpO2: 98%   Weight: 69.9 kg (154 lb)   Height: 170.2 cm (67\")   PainSc:   4   PainLoc: Breast       Physical Exam:    Physical Exam  Vitals and nursing note reviewed.   Constitutional:       Appearance: Normal appearance. He is well-developed.   HENT:      Head: Normocephalic and atraumatic.   Eyes:      Pupils: Pupils are equal, round, and reactive to light.   Cardiovascular:      Rate and Rhythm: Normal rate and regular rhythm.      Heart sounds: Normal heart sounds.   Pulmonary:      Effort: Pulmonary effort is normal.      Breath sounds: Normal breath sounds.   Chest:          Comments: Mobile [id number] smooth mass felt, not tender  Abdominal:      General: Bowel sounds are normal. There is no distension.      Palpations: Abdomen is soft.      Tenderness: There is no abdominal tenderness.   Musculoskeletal:      Cervical " back: Neck supple.   Skin:     General: Skin is warm and dry.      Capillary Refill: Capillary refill takes less than 2 seconds.   Neurological:      General: No focal deficit present.      Mental Status: He is alert and oriented to person, place, and time.   Psychiatric:         Mood and Affect: Mood normal.         Behavior: Behavior normal.         Assessment / Plan     Assessment/Plan:   Problem List Items Addressed This Visit     None      Visit Diagnoses     Breast mass in male    -  Primary    Relevant Orders    US breast left complete        -- check US, feels like a cyst, no other red flags    Follow up:  As needed    Electronically signed by EMILY Wilder   05/10/2021 09:40 EDT      Please note that portions of this note may have been completed with a voice recognition program. Efforts were made to edit the dictations, but occasionally words are mistranscribed.

## 2021-05-24 ENCOUNTER — HOSPITAL ENCOUNTER (OUTPATIENT)
Dept: ULTRASOUND IMAGING | Facility: HOSPITAL | Age: 21
Discharge: HOME | End: 2021-05-24
Admitting: NURSE PRACTITIONER

## 2021-05-24 DIAGNOSIS — N63.0 BREAST MASS IN MALE: ICD-10-CM

## 2021-05-24 PROCEDURE — 76642 ULTRASOUND BREAST LIMITED: CPT

## 2021-05-24 NOTE — PROGRESS NOTES
Palpable area of abnormality in his left breast was felt to be a lipoma.  These are benign areas of fatty tissue.  Continue to monitor for now

## 2021-10-18 ENCOUNTER — OFFICE VISIT (OUTPATIENT)
Dept: FAMILY MEDICINE CLINIC | Facility: CLINIC | Age: 21
End: 2021-10-18

## 2021-10-18 VITALS
OXYGEN SATURATION: 98 % | BODY MASS INDEX: 26.71 KG/M2 | SYSTOLIC BLOOD PRESSURE: 120 MMHG | TEMPERATURE: 98.2 F | WEIGHT: 170.2 LBS | HEART RATE: 79 BPM | DIASTOLIC BLOOD PRESSURE: 82 MMHG | HEIGHT: 67 IN

## 2021-10-18 DIAGNOSIS — E78.89: Primary | ICD-10-CM

## 2021-10-18 PROCEDURE — 99213 OFFICE O/P EST LOW 20 MIN: CPT | Performed by: FAMILY MEDICINE

## 2021-10-18 NOTE — PATIENT INSTRUCTIONS
Lipoma    A lipoma is a noncancerous (benign) tumor that is made up of fat cells. This is a very common type of soft-tissue growth. Lipomas are usually found under the skin (subcutaneous). They may occur in any tissue of the body that contains fat. Common areas for lipomas to appear include the back, arms, shoulders, buttocks, and thighs.  Lipomas grow slowly, and they are usually painless. Most lipomas do not cause problems and do not require treatment.  What are the causes?  The cause of this condition is not known.  What increases the risk?  You are more likely to develop this condition if:  · You are 40-60 years old.  · You have a family history of lipomas.  What are the signs or symptoms?  A lipoma usually appears as a small, round bump under the skin. In most cases, the lump will:  · Feel soft or rubbery.  · Not cause pain or other symptoms.  However, if a lipoma is located in an area where it pushes on nerves, it can become painful or cause other symptoms.  How is this diagnosed?  A lipoma can usually be diagnosed with a physical exam. You may also have tests to confirm the diagnosis and to rule out other conditions. Tests may include:  · Imaging tests, such as a CT scan or an MRI.  · Removal of a tissue sample to be looked at under a microscope (biopsy).  How is this treated?  Treatment for this condition depends on the size of the lipoma and whether it is causing any symptoms.  · For small lipomas that are not causing problems, no treatment is needed.  · If a lipoma is bigger or it causes problems, surgery may be done to remove the lipoma. Lipomas can also be removed to improve appearance. Most often, the procedure is done after applying a medicine that numbs the area (local anesthetic).  · Liposuction may be done to reduce the size of the lipoma before it is removed through surgery, or it may be done to remove the lipoma. Lipomas are removed with this method in order to limit incision size and scarring. A  liposuction tube is inserted through a small incision into the lipoma, and the contents of the lipoma are removed through the tube with suction.  Follow these instructions at home:  · Watch your lipoma for any changes.  · Keep all follow-up visits as told by your health care provider. This is important.  Contact a health care provider if:  · Your lipoma becomes larger or hard.  · Your lipoma becomes painful, red, or increasingly swollen. These could be signs of infection or a more serious condition.  Get help right away if:  · You develop tingling or numbness in an area near the lipoma. This could indicate that the lipoma is causing nerve damage.  Summary  · A lipoma is a noncancerous tumor that is made up of fat cells.  · Most lipomas do not cause problems and do not require treatment.  · If a lipoma is bigger or it causes problems, surgery may be done to remove the lipoma.  · Contact a health care provider if your lipoma becomes larger or hard, or if it becomes painful, red, or increasingly swollen. Pain, redness, and swelling could be signs of infection or a more serious condition.  This information is not intended to replace advice given to you by your health care provider. Make sure you discuss any questions you have with your health care provider.  Document Revised: 08/03/2020 Document Reviewed: 08/03/2020  [employer] Patient Education © 2021 [employer]

## 2021-10-18 NOTE — PROGRESS NOTES
Established Patient        Chief Complaint:   Chief Complaint   Patient presents with   • Mass     Patient here with concerns of bump on Left arm and lower abdomen x 2 months        Gerald Ge is a 21 y.o. male    History of Present Illness:   Here today for evaluation of recently found nodular change to his left forearm, as well as a couple areas to his abdomen.  Also had an associated area to his left chest.  Ultrasound demonstrated findings consistent with lipoma.  Areas have not changed, they are nontender to palpation.  No reports of any fever, chills or night sweats.  He denies any significant/noticeable change in size.    Subjective     The following portions of the patient's history were reviewed and updated as appropriate: allergies, current medications, past family history, past medical history, past social history, past surgical history and problem list.    Allergies   Allergen Reactions   • Wellbutrin [Bupropion] Mental Status Change       Review of Systems  1. Constitutional: Negative for fever. Negative for chills, diaphoresis, fatigue and unexpected weight change.   2. HENT: No dysphagia; no changes to vision/hearing/smell/taste; no epistaxis  3. Eyes: Negative for redness and visual disturbance.   4. Respiratory: negative for shortness of breath. Negative for chest pain . Negative for cough and chest tightness.   5. Cardiovascular: Negative for chest pain and palpitations.   6. Gastrointestinal: Negative for abdominal distention, abdominal pain and blood in stool.   7. Endocrine: Negative for cold intolerance and heat intolerance.   8. Genitourinary: Negative for difficulty urinating, dysuria and frequency.   9. Musculoskeletal: Negative for arthralgias, back pain and myalgias.   10. Skin: As per above.  11. Neurological: Negative for syncope, weakness and headaches.   12. Hematological: Negative for adenopathy. Does not bruise/bleed easily.   13. Psychiatric/Behavioral:  "Negative for confusion. The patient is not nervous/anxious.    Objective     Physical Exam   Vital Signs: /82 (BP Location: Right arm, Patient Position: Sitting, Cuff Size: Adult)   Pulse 79   Temp 98.2 °F (36.8 °C)   Ht 170.2 cm (67.01\")   Wt 77.2 kg (170 lb 3.2 oz)   SpO2 98%   BMI 26.65 kg/m²     General Appearance: alert, oriented x 3, no acute distress.  Skin: warm and dry.  Nodular change to the left forearm, as well as lower abdomen, mobile well-circumscribed areas consistent with lipomas.  HEENT: Atraumatic.  pupils round and reactive to light and accommodation, oral mucosa pink and moist.  Nares patent without epistaxis.  External auditory canals are patent tympanic membranes intact.  Neck: supple, no JVD, trachea midline.  No thyromegaly  Lungs: CTA, unlabored breathing effort.  Heart: RRR, normal S1 and S2, no S3, no rub.  Abdomen: soft, non-tender, no palpable bladder, present bowel sounds to auscultation ×4.  No guarding or rigidity.  Extremities: no clubbing, cyanosis or edema.  Good range of motion actively and passively.  Symmetric muscle strength and development  Neuro: normal speech and mental status.  Cranial nerves II through XII intact.  No anosmia. DTR 2+; proprioception intact.  No focal motor/sensory deficits.    Assessment and Plan      Assessment:   Diagnoses and all orders for this visit:    1. Lipomatosis, nodular circumscribed (Primary)        Plan:  I have discussed the benign nature of the lipomas with patient today.  He verbalized understanding.  He does not wish to pursue any surgical excision at this time.  I have stressed continue surveillance/monitoring.    Should the areas change, have asked that he return to the clinic for reevaluation and potential change in treatment regimen.    Discussion Summary:    I spent 20 minutes caring for Gerald on this date of service. This time includes time spent by me in the following activities:preparing for the visit, performing a " medically appropriate examination and/or evaluation , counseling and educating the patient/family/caregiver, ordering medications, tests, or procedures, documenting information in the medical record and care coordination     Discussed plan of care in detail with pt today; pt verb understanding and agrees.  Follow up:  Return in about 5 months (around 3/18/2022) for Annual physical.     Patient Instructions   Lipoma    A lipoma is a noncancerous (benign) tumor that is made up of fat cells. This is a very common type of soft-tissue growth. Lipomas are usually found under the skin (subcutaneous). They may occur in any tissue of the body that contains fat. Common areas for lipomas to appear include the back, arms, shoulders, buttocks, and thighs.  Lipomas grow slowly, and they are usually painless. Most lipomas do not cause problems and do not require treatment.  What are the causes?  The cause of this condition is not known.  What increases the risk?  You are more likely to develop this condition if:  · You are 40-60 years old.  · You have a family history of lipomas.  What are the signs or symptoms?  A lipoma usually appears as a small, round bump under the skin. In most cases, the lump will:  · Feel soft or rubbery.  · Not cause pain or other symptoms.  However, if a lipoma is located in an area where it pushes on nerves, it can become painful or cause other symptoms.  How is this diagnosed?  A lipoma can usually be diagnosed with a physical exam. You may also have tests to confirm the diagnosis and to rule out other conditions. Tests may include:  · Imaging tests, such as a CT scan or an MRI.  · Removal of a tissue sample to be looked at under a microscope (biopsy).  How is this treated?  Treatment for this condition depends on the size of the lipoma and whether it is causing any symptoms.  · For small lipomas that are not causing problems, no treatment is needed.  · If a lipoma is bigger or it causes problems,  surgery may be done to remove the lipoma. Lipomas can also be removed to improve appearance. Most often, the procedure is done after applying a medicine that numbs the area (local anesthetic).  · Liposuction may be done to reduce the size of the lipoma before it is removed through surgery, or it may be done to remove the lipoma. Lipomas are removed with this method in order to limit incision size and scarring. A liposuction tube is inserted through a small incision into the lipoma, and the contents of the lipoma are removed through the tube with suction.  Follow these instructions at home:  · Watch your lipoma for any changes.  · Keep all follow-up visits as told by your health care provider. This is important.  Contact a health care provider if:  · Your lipoma becomes larger or hard.  · Your lipoma becomes painful, red, or increasingly swollen. These could be signs of infection or a more serious condition.  Get help right away if:  · You develop tingling or numbness in an area near the lipoma. This could indicate that the lipoma is causing nerve damage.  Summary  · A lipoma is a noncancerous tumor that is made up of fat cells.  · Most lipomas do not cause problems and do not require treatment.  · If a lipoma is bigger or it causes problems, surgery may be done to remove the lipoma.  · Contact a health care provider if your lipoma becomes larger or hard, or if it becomes painful, red, or increasingly swollen. Pain, redness, and swelling could be signs of infection or a more serious condition.  This information is not intended to replace advice given to you by your health care provider. Make sure you discuss any questions you have with your health care provider.  Document Revised: 08/03/2020 Document Reviewed: 08/03/2020  [employer] Patient Education © 2021 [employer]        Vega Nielsen DO  10/18/21  11:56 EDT          Please note that portions of this note may have been completed with a voice recognition  program. Efforts were made to edit the dictations, but occasionally words are mistranscribed.

## 2022-05-26 NOTE — TELEPHONE ENCOUNTER
1 day PO: Patient is doing well post-operatively. The importance of post-op drop compliance was emphasized. Drop schedule reviewed with patient. Restrictions d/w pt including no bending/heavy lifting, wear shield at night. Patient to call if any visual changes or concerns. Follow up as directed. Ok to fill?    LOV: 09/17/2020      Patient needs CSA and UDS

## 2023-08-23 ENCOUNTER — OFFICE VISIT (OUTPATIENT)
Dept: FAMILY MEDICINE CLINIC | Facility: CLINIC | Age: 23
End: 2023-08-23
Payer: COMMERCIAL

## 2023-08-23 VITALS
OXYGEN SATURATION: 97 % | DIASTOLIC BLOOD PRESSURE: 68 MMHG | HEIGHT: 67 IN | WEIGHT: 146.6 LBS | TEMPERATURE: 98.8 F | SYSTOLIC BLOOD PRESSURE: 118 MMHG | HEART RATE: 69 BPM | BODY MASS INDEX: 23.01 KG/M2

## 2023-08-23 DIAGNOSIS — F33.0 MILD EPISODE OF RECURRENT MAJOR DEPRESSIVE DISORDER: ICD-10-CM

## 2023-08-23 DIAGNOSIS — F90.9 ATTENTION DEFICIT HYPERACTIVITY DISORDER (ADHD), UNSPECIFIED ADHD TYPE: ICD-10-CM

## 2023-08-23 DIAGNOSIS — F10.10 ETOH ABUSE: Primary | ICD-10-CM

## 2023-08-23 PROCEDURE — 99213 OFFICE O/P EST LOW 20 MIN: CPT | Performed by: NURSE PRACTITIONER

## 2023-08-23 NOTE — PROGRESS NOTES
"      Subjective     Chief Complaint:    Chief Complaint   Patient presents with    referral request     Mental Health Referral request-alcoholism and depression       History of Present Illness:   Request referral for counseling  Hx of etoh abuse, binge drinking, off and on for 2 years, has been sober about 5 weeks but notes he is getting urges to drink again  Has add, not on treatment currently     Review of Systems  Gen- No fevers, chills  CV- No chest pain, palpitations  Resp- No cough, dyspnea  GI- No N/V/D, abd pain  Neuro-No dizziness, headaches      I have reviewed and/or updated the patient's past medical, surgical, family, social history and problem list as appropriate.     Medications:  No current outpatient medications on file.    Allergies:  Allergies   Allergen Reactions    Wellbutrin [Bupropion] Mental Status Change       Objective     Vital Signs:   Vitals:    08/23/23 1630   BP: 118/68   BP Location: Right arm   Patient Position: Sitting   Cuff Size: Adult   Pulse: 69   Temp: 98.8 °F (37.1 °C)   TempSrc: Oral   SpO2: 97%   Weight: 66.5 kg (146 lb 9.6 oz)   Height: 170.2 cm (67\")  Comment: patient stated   PainSc: 0-No pain     Body mass index is 22.96 kg/m².    Physical Exam:    Physical Exam  Vitals and nursing note reviewed.   Constitutional:       Appearance: He is well-developed.   HENT:      Head: Normocephalic and atraumatic.   Eyes:      Pupils: Pupils are equal, round, and reactive to light.   Cardiovascular:      Rate and Rhythm: Normal rate and regular rhythm.      Heart sounds: Normal heart sounds.   Pulmonary:      Effort: Pulmonary effort is normal.      Breath sounds: Normal breath sounds.   Abdominal:      General: Bowel sounds are normal. There is no distension.      Palpations: Abdomen is soft.      Tenderness: There is no abdominal tenderness.   Musculoskeletal:      Cervical back: Neck supple.   Skin:     General: Skin is warm and dry.   Neurological:      General: No focal " deficit present.      Mental Status: He is alert and oriented to person, place, and time.   Psychiatric:         Mood and Affect: Mood normal.         Behavior: Behavior normal.       Assessment / Plan     Assessment/Plan:   Problem List Items Addressed This Visit    None  Visit Diagnoses       ETOH abuse    -  Primary    Relevant Orders    Ambulatory Referral to Behavioral Health    Ambulatory Referral to Chemical Dependency    Mild episode of recurrent major depressive disorder        Relevant Orders    Ambulatory Referral to Behavioral Health    Attention deficit hyperactivity disorder (ADHD), unspecified ADHD type        Relevant Orders    Ambulatory Referral to Behavioral Health              Discussed plan of care in detail with pt today; pt verb understanding and agrees.    Follow up:  As needed    Electronically signed by EMILY Wilder   08/23/2023 16:55 EDT      Please note that portions of this note were completed with a voice recognition program.

## 2023-09-01 ENCOUNTER — OFFICE VISIT (OUTPATIENT)
Dept: PSYCHIATRY | Facility: CLINIC | Age: 23
End: 2023-09-01
Payer: COMMERCIAL

## 2023-09-01 VITALS
SYSTOLIC BLOOD PRESSURE: 122 MMHG | BODY MASS INDEX: 26.3 KG/M2 | WEIGHT: 167.6 LBS | HEART RATE: 104 BPM | HEIGHT: 67 IN | DIASTOLIC BLOOD PRESSURE: 70 MMHG

## 2023-09-01 DIAGNOSIS — F10.20 ALCOHOL USE DISORDER, SEVERE, DEPENDENCE: Primary | ICD-10-CM

## 2023-09-01 NOTE — PROGRESS NOTES
New Patient Office Visit        Patient Name: Gerald Ge  : 2000   MRN: [id number]     Referring Provider: Vega Nielsen DO    Chief Complaint: Substance use    History of Present Illness:   Gerald Ge is a 23 y.o. male who is here today for initial evaluation with provider related to substance use. Initial substance at age 20 with alcohol and use escalated quickly. Was drinking average of 6-10 beers or 5th of liquor plus tall boy 4-5 days a week. Stopped cold turkey and was able to maintain sobriety for about year. Use eventually recurred at previous pace. Felt use was having a negative impact on mental health and mood worsened. Had SI with plan on several occasions while intoxicated. In 2023, while heavily intoxicated, went and laid in the road in an attempt to end his life. Friend found him. He was remorseful when sober and made the decision to completely quit drinking. Had one return to use 7/2023 x1 night but has been able to maintain his sobriety otherwise. Also reports some marijuana use over the years but does not feel it was ever problematic. Last intake 2023. Cravings for alcohol are slowly improving. Having 1-2 weekly that are typically triggered by stress, contact with alcohol, or seeing it on TV. Denies previous [facility] treatment. Reviewed DSM-V criteria with patient and meets requirements for alcohol use disorder, severe.     Currently lives in [place] with his mother, twin sister, and brother. Identifies sober support system of his family. Currently employed full-time by [employer]. License is active and has a vehicle. Denies legal issues related to use. Motivated to stay sober for his mental health and family.     Has psych history of ADHD diagnosed around age 15-16. Likely hyperactive/impulsive type. Has been on Ritalin in the past that was helpful for concentration but made him feel too sedated and decreased appetite. Has also tried Concerta and individual therapy. Feels he currently  hector well overall with his ADHD and other symptoms. Reports some situational stressors at work but it trying to move to a different position. Sleep and appetite are good. Denies prior psychiatric hospitalizations. Denies SI/HI, AVH. +FH of AUD in adoptive father. Adopted as an infant and is unsure of biological family history. Denies significant PMH. No seizure hx. Currently has a primary care physician (Dr. Nielsen).      Triggers: stress, contact with alcohol, or seeing it on TV    Cravings: 1-2 weekly for alcohol    Relapse Prevention: Declines MAT. IOP screener scheduled, peer support, SMART Recovery meetings encouraged    Urine Drug Screen (today's visit) discussed: N/A    UDS Confirmation: N/A    BREANNA (PDMP) Reviewed for Current/Active Medications: No active medication    DSM 5 Alcohol Use Disorder Checklist     Diagnostic Criteria   (Substance use disorder requires at least 2 criteria be met within 12 month period)   Meets Criteria          Yes / No  Notes/Supporting Information    Substance often taken in larger amounts or over a longer period than intended.  yes    2.  There is a persistent desire or unsuccessful effort to cut        down or control th substance use.  yes    3.  A great deal of time is spent in activities necessary to        obtain the substance, use the substance, or recover        from its effects.  yes    4.  Craving or a strong desire to use the substance.  yes    5.  Recurrent substance use resulting in failure to fulfill        major role obligations at work, school, or home.  no    6.  Continued substance use despite having persistent or         recurrent social or interpersonal problems caused or         exacerbated by the effects of the substance.  yes    7.   Important social, occupational or recreational activities        are given up or reduced because of substance use.  no    8.   Recurrent substance use in situations in which it is        physically hazardous.  yes    9.   Continued use despite knowledge of having a         persistent or recurrent physical or psychological         problem that is likely to have been caused or        exacerbated by the substance.  yes    10. * Tolerance, as defined by either of the following:          a. A need for markedly increased amounts of the              Substance to achieve intoxication or desired effect.          b. Markedly diminished effect with continued use of              The same substance.  yes    11.  * Withdrawal, as manifested by either of the following:         a. The characteristic withdrawal for the substance.          b. The same (or closely related) substance is taken to               Relieve or avoid withdrawal symptoms.  yes  H/a, tremor, insomnia   **This criterion is not considered to be met for those individuals taking prescriptions opiates solely under medical supervision. **   Severity: Mild: 2-3 symptoms, Moderate: 4-5 symptoms, Severe: 6 or more symptoms.         Past Surgical History:  Past Surgical History:   Procedure Laterality Date    TONSILLECTOMY         Problem List:  Patient Active Problem List   Diagnosis    Acne vulgaris    Attention deficit disorder (ADD) in adult    Reactive airway disease       Allergy:   Allergies   Allergen Reactions    Wellbutrin [Bupropion] Mental Status Change        Current Medications:   No current outpatient medications on file.     No current facility-administered medications for this visit.       Past Medical History:  Past Medical History:   Diagnosis Date    ADHD (attention deficit hyperactivity disorder)        Social History:  Social History     Socioeconomic History    Marital status: Single   Tobacco Use    Smoking status: Former     Packs/day: 0.25     Years: 0.50     Pack years: 0.13     Types: Cigarettes     Start date: 2023     Quit date: 2023     Years since quittin.1     Passive exposure: Never    Smokeless tobacco: Never   Vaping Use    Vaping Use: Former    Substance and Sexual Activity    Alcohol use: Not Currently     Comment: occasionally, states he has a history of alcoholism    Drug use: No    Sexual activity: Defer       Family History:  Family History   Adopted: Yes   Problem Relation Age of Onset    No Known Problems Mother     No Known Problems Father          Subjective      Review of Systems:   Review of Systems   Constitutional:  Negative for chills, fatigue and fever.   Respiratory:  Negative for shortness of breath.    Cardiovascular:  Negative for chest pain.   Gastrointestinal:  Negative for abdominal pain.   Skin:  Negative for skin lesions.   Neurological:  Negative for seizures and confusion.   Psychiatric/Behavioral:  Positive for decreased concentration and stress. Negative for hallucinations, sleep disturbance, suicidal ideas and depressed mood. The patient is not nervous/anxious.      PHQ-9 Depression Screening  Little interest or pleasure in doing things? 0-->not at all   Feeling down, depressed, or hopeless? 0-->not at all   Trouble falling or staying asleep, or sleeping too much? 0-->not at all   Feeling tired or having little energy? 2-->more than half the days   Poor appetite or overeating? 0-->not at all   Feeling bad about yourself - or that you are a failure or have let yourself or your family down? 0-->not at all   Trouble concentrating on things, such as reading the newspaper or watching television? 1-->several days   Moving or speaking so slowly that other people could have noticed? Or the opposite - being so fidgety or restless that you have been moving around a lot more than usual?     Thoughts that you would be better off dead, or of hurting yourself in some way? 0-->not at all   PHQ-9 Total Score 3   If you checked off any problems, how difficult have these problems made it for you to do your work, take care of things at home, or get along with other people? somewhat difficult        REBEKAH-7 Score:   Feeling nervous, anxious or on  edge: More than half the days  Not being able to stop or control worrying: Several days  Worrying too much about different things: Not at all  Trouble Relaxing: Several days  Being so restless that it is hard to sit still: Not at all  Feeling afraid as if something awful might happen: More than half the days  Becoming easily annoyed or irritable: More than half the days  REBEKAH 7 Total Score: 8  If you checked any problems, how difficult have these problems made it for you to do your work, take care of things at home, or get along with other people: Somewhat difficult    Patient History:   The following portions of the patient's history were reviewed and updated as appropriate: allergies, current medications, past family history, past medical history, past social history, past surgical history and problem list.     Social:  Social History     Socioeconomic History    Marital status: Single   Tobacco Use    Smoking status: Former     Packs/day: 0.25     Years: 0.50     Pack years: 0.13     Types: Cigarettes     Start date: 2023     Quit date: 2023     Years since quittin.1     Passive exposure: Never    Smokeless tobacco: Never   Vaping Use    Vaping Use: Former   Substance and Sexual Activity    Alcohol use: Not Currently     Comment: occasionally, states he has a history of alcoholism    Drug use: No    Sexual activity: Defer       Medications:   No current outpatient medications on file.    Objective     Physical Exam  Vitals reviewed.   Constitutional:       General: He is not in acute distress.     Appearance: He is well-developed. He is not ill-appearing.   Pulmonary:      Effort: No respiratory distress.   Neurological:      Mental Status: He is alert and oriented to person, place, and time.      Gait: Gait normal.   Psychiatric:         Attention and Perception: Attention normal.         Mood and Affect: Mood and affect normal.         Speech: Speech normal.         Behavior: Behavior normal. Behavior  "is cooperative.         Thought Content: Thought content is not paranoid or delusional. Thought content does not include homicidal or suicidal ideation. Thought content does not include homicidal or suicidal plan.         Cognition and Memory: Cognition and memory normal.         Judgment: Judgment normal.       Vital Signs:   Vitals:    09/01/23 0940 09/01/23 1006   BP: 122/70    Pulse: 104    Weight: 68.8 kg (151 lb 9.6 oz) 76 kg (167 lb 9.6 oz)   Height: 170.2 cm (67\")      Body mass index is 26.25 kg/mý.     Mental Status Exam:   Hygiene:   good  Cooperation:  Cooperative  Eye Contact:  Good  Psychomotor Behavior:  Restless  Affect:  Full range  Mood: normal  Speech:  Normal  Thought Process:  Goal directed  Thought Content:  Normal  Suicidal:  None  Homicidal:  None  Hallucinations:  None  Delusion:  None  Memory:  Intact  Orientation:  Person, Place, Time, and Situation  Reliability:  good  Insight:  Good  Judgement:  Good  Impulse Control:  Fair    Assessment / Plan      -Discussed MAT for AUD. He declines at this time as he feels he is doing well. Agreeable to call if cravings worsen.  -Advisement to take part in and remain active in recovery meetings, IOP, and/or 1:1 therapy/counseling and to establish/maintain an active relationship with a recovery sponsor.   -Agreeable to IOP and screener scheduled  -LEANDRA signed for peer support and referral placed.  -Feels he is coping well with ADHD symptoms currently. He is welcome to call and schedule evaluation if things ever change.  -UDS ordered for IOP compliance    Assessment & Plan   Problems Addressed this Visit    None  Visit Diagnoses       Alcohol use disorder, severe, dependence    -  Primary          Diagnoses         Codes Comments    Alcohol use disorder, severe, dependence    -  Primary ICD-10-CM: F10.20  ICD-9-CM: 303.90             Visit Diagnoses:    ICD-10-CM ICD-9-CM   1. Alcohol use disorder, severe, dependence  F10.20 303.90       PLAN:  Safety: " No acute safety concerns  Risk Assessment: Risk of self-harm acutely is low. Risk of self-harm chronically is also low, but could be further elevated in the event of treatment noncompliance and/or AODA.    TREATMENT PLAN: Continue supportive psychotherapy efforts and medications as indicated. Treatment and medication options discussed during today's visit. Patient acknowledged and verbally consented to continue with current treatment plan and was educated on the importance of compliance with treatment and follow-up appointments.    GOALS:  Short Term Goals: Patient will be compliant with medication, and patient will have no significant medication related side effects.  Patient will be engaged in psychotherapy as indicated.  Patient will report subjective improvement of symptoms.  Long term goals: To stabilize mood and treat/improve subjective symptoms, the patient will stay out of the hospital, the patient will be at an optimal level of functioning, and the patient will take all medications as prescribed.  The patient/guardian verbalized understanding and agreement with goals that were mutually set.    MEDICATION ISSUES:  BREANNA reviewed as expected.  Discussed medication options and treatment plan of prescribed medication as well as the risks, benefits, and side effects including potential falls, possible impaired driving and metabolic adversities among others. Patient is agreeable to call the office with any worsening of symptoms or onset of side effects. Patient is agreeable to call 911 or go to the nearest ER should he/she begin having SI/HI. No medication side effects or related complaints today.       TOBACCO USE:  Former smoker    I advised Gerald Ge of the risks of tobacco use.     MEDS ORDERED DURING VISIT:  No orders of the defined types were placed in this encounter.      Return if symptoms worsen or fail to improve.           This document has been electronically signed by Tayla Orlando  APRN  September 1, 2023 11:24 EDT      Part of this note may be an electronic transcription/translation of spoken language to printed text using the Dragon Dictation System.

## 2023-10-09 ENCOUNTER — OFFICE VISIT (OUTPATIENT)
Dept: PSYCHIATRY | Facility: CLINIC | Age: 23
End: 2023-10-09
Payer: COMMERCIAL

## 2023-10-09 DIAGNOSIS — F10.21 ALCOHOL USE DISORDER, SEVERE, IN EARLY REMISSION: Primary | ICD-10-CM

## 2023-10-11 NOTE — PROGRESS NOTES
"IOP Initial Assessment   Assessment completed on 10/9/2023:  Date: 10/09/2023  Name: Gerald Ge    PCP: Vega Nielsen DO.  Referred by: Self.    Identifying Information:   Assessment completed on 10/9/2023:  Gerald Ge, is a 23 y.o. male presents for an initial IOP assessment with Dandre Cannon LCSW at [facility].     Patient reports that he lives in [place]. Patient reports he lives with his mother, twin sister, and his brother. Patient reports she does not have any children. Patient described living situation as stable. Patient discussed that he is adopted. Patient reports that he works full time at [employer]. Patient reports license is active. Patient reports that he has vehicle. Patient reports sober supports are his friends and family. Patient reports motivation for treatment is better health and better mental health. Patient reports no current legal charges. Patient reports he is seeking treatment on his own accord.     History Present Illness, Onset, Duration, Course:   Patient during assessment discussed substance use history. Patient reports history of nicotine use. Patient reports cigarette use. Patient reports first use was "years ago". Patient reports he would use 4-5 per day around 3-4 times per week. Patient reports last nicotine use was 3 months ago.     Patient reports history of marijuana use. Patient reports age at first use was age 22. Patient reports he used a pen. Patient reports he would use 4 times per week. Patient reports last marijuana use was at the end of June.     Patient reports history of mushroom use. Patient reports use was not problematic. Patient reports he used three times in total around once per month. Patient reports last mushroom use was 6 months ago.     Patient reports history of alcohol use. Patient reports age at first use was age 20. Patient reports he used alcohol almost every night. Patient reports he drank for a year, then stopped for a year, then drank " again for another 1-2 years. Patient reports he would drink 3-4 mixed drinks. Patient reports on the weekends, he would drink a fifth of vodka. Patient reports last alcohol use was on June 22nd.     Previous Psychiatric History:    History of prior treatment or hospitalization: Patient reports no previous treatment history. Patient denied history of mental health hospitalizations. Patient reports he is not on any medications currently. Patient reports he has been diagnosed with ADHD and 7-10 years ago he was prescribed Ritalin and Concerta. Patient reports his symptoms have been better since he has quit drinking. Patient denied history of seizures. Patient reports PCP is Dr. Nielsen.     History of use of psychotropics: Patient reports he is not on any medications currently. Patient reports he has been diagnosed with ADHD and 7-10 years ago he was prescribed Ritalin and Concerta.    History of suicide attempts: Patient during assessment denied current suicidal thoughts or plan or intent to hurt self or death wishes. Patient during assessment denied suicidal thoughts or plan or intent to hurt self in the last 4 months. Patient reports when he was drinking 4 months ago, he laid down in the road. Patient also reported around [place] of last year, he thought about using hunting knives to harm himself.     History of violence: Patient during assessment denied current or history of homicidal thoughts or plan or intent to hurt others. Patient during assessment denied history of violence.     Personal Assessment: 1-10 Scale (10 worst)    Anxiety: Patient during assessment rated anxiety as a 2 on a 1:10 scale (1-low).      Depression: Patient during assessment rated depression as a 1 on a 1:10 scale (1-low).       Suicidal Ideation:   Patient during assessment denied current suicidal thoughts or plan or intent to hurt self or death wishes. Patient during assessment denied suicidal thoughts or plan or intent to hurt self in  "the last 4 months. Patient reports when he was drinking 4 months ago, he laid down in the road. Patient also reported around [place] of last year, he thought about using hunting knives to harm himself.     Patient during assessment denied current or history of homicidal thoughts or plan or intent to hurt others. Patient during assessment denied history of violence.     Patient during assessment denied history of hallucinations.     Family Psychiatric History:  Family history is significant for psychiatric issues: Patient reports he is unsure about family history of mental health as he was adopted.     Family history is significant for substance abuse issues: Patient reports his adopted father used alcohol.     Life Events/Trauma History:   Patient during assessment denied history of trauma or abuse.     Medical History:   I have reviewed this patient's past medical history.    Are there any significant health issues (current or past): Per chart review, history of ADHD.     History of seizures: Patient denied history of seizures.     Family History   Adopted: Yes   Problem Relation Age of Onset    No Known Problems Mother     No Known Problems Father        Current Medications:   No current outpatient medications on file.     No current facility-administered medications for this visit.       Relational History:  Difficulty getting along with peers: no  Difficulty making new friendships: no  Difficulty maintaining friendships: no  Close with family members: yes    Legal History:  Patient denied current legal situation.     Substance Abuse History:   Patient during assessment discussed substance use history. Patient reports history of nicotine use. Patient reports cigarette use. Patient reports first use was "years ago". Patient reports he would use 4-5 per day around 3-4 times per week. Patient reports last nicotine use was 3 months ago.     Patient reports history of marijuana use. Patient reports age at first use " was age 22. Patient reports he used a pen. Patient reports he would use 4 times per week. Patient reports last marijuana use was at the end of June.     Patient reports history of mushroom use. Patient reports use was not problematic. Patient reports he used three times in total around once per month. Patient reports last mushroom use was 6 months ago.     Patient reports history of alcohol use. Patient reports age at first use was age 20. Patient reports he used alcohol almost every night. Patient reports he drank for a year, then stopped for a year, then drank again for another 1-2 years. Patient reports he would drink 3-4 mixed drinks. Patient reports on the weekends, he would drink a fifth of vodka. Patient reports last alcohol use was on June 22nd.     History of DT's: No.                       History of Seizures: Patient denied history of seizures.     Withdrawal Symptoms: In discussing withdrawal symptoms, patient reports he had bad headaches and shaky.       Cravings: Patient during assessment rated cravings as an 8 on a 1:10 scale (1-low). Patient reports that is why he is seeking treatment. Patient reports last weekend he was tempted to drink hard seltzer.     Mental Status Exam:   Affect: Appropriate  Cooperation: Cooperative  Delusion: None  Eye Contact: Good  Hallucinations: None  Homicidal: None  Suicidal: None  Cognition: Good  Memory: Intact  Orientation: Person, Place, Time, and Situation  Psychomotor Behaviors: Appropriate  Speech: Normal  Though Content: Normal  Thought Progress: Linear  Hopelessness: Patient during assessment denied feelings of hopelessness.    Reliability: fair  Insight: Fair  Judgement: Fair  Impulse Control: Fair  Physical/Medical Issues: Patient reports he has ADHD.     Patient resources/assets: Patient reports he is employed and identified family and friends as his sober supports.     ASAM Dimensions:  I.    Intoxication/Withdrawal:  1  (Due to patient reported history of  withdrawal symptoms).  II.   Medical Conditions/Complications:  1 (Patient reports history of ADHD).  III.  Behavioral/Emotional/Cognitive: 2 (Due to patient reported history of suicidal thoughts. Patient during assessment denied current suicidal thoughts or plan or intent to hurt self).  IV.  Readiness to Change: 1 (Patient identified motivation for treatment).  V.   Relapse Risk: 2 (Due to patient reported cravings and being tempted to use alcohol over the weekend).  VI:  Recovery Environment: 0 (Patient reported stable living situation and identified family and friends as sober supports).    Total ASAM Score = 07     Baseline Scores: 10/09/2023  REBEKAH-7   (05)                  PHQ-9   (04)       Impression/Formulation: Substance use disorder diagnostic criteria verbally reviewed with patient during assessment for alcohol use and marijuana use. Based on patient self-report during this assessment, patient met 11 of 11 criteria for alcohol use and 1 of 11 criteria for marijuana use. Therefore, diagnosis of alcohol use disorder, severe, in early remission listed.     VISIT DIAGNOSIS:     ICD-10-CM ICD-9-CM   1. Alcohol use disorder, severe, in early remission  F10.21 303.93     Patient appeared alert and oriented.      Plan:  Confidentiality and reporting requirements verbally explained to patient during assessment and patient verbally agreed.     Safety plan of report to police or hospital, or call 911 if feeling unsafe, if having suicidal or homicidal thoughts, or if in emergency need of medications verbally reviewed with patient during assessment and suicide prevention hotline number verbally provided to patient during assessment. Patient during assessment verbally agreed to safety plan. Clinician also recommend to patient that weapons and sharps be removed. Patient signed a hard copy of safety plan which has been scanned into chart.     Patient agreed to CD-IOP start date of Monday 10/16/2023.    Dandre Cannon  LCSW  10/11/2023  09:35 EDT

## 2023-10-16 ENCOUNTER — OFFICE VISIT (OUTPATIENT)
Dept: PSYCHIATRY | Facility: HOSPITAL | Age: 23
End: 2023-10-16
Payer: COMMERCIAL

## 2023-10-16 DIAGNOSIS — F10.21 ALCOHOL USE DISORDER, SEVERE, IN EARLY REMISSION: Primary | ICD-10-CM

## 2023-10-16 PROCEDURE — H0015 ALCOHOL AND/OR DRUG SERVICES: HCPCS | Performed by: NURSE PRACTITIONER

## 2023-10-18 ENCOUNTER — OFFICE VISIT (OUTPATIENT)
Dept: PSYCHIATRY | Facility: HOSPITAL | Age: 23
End: 2023-10-18
Payer: COMMERCIAL

## 2023-10-18 ENCOUNTER — LAB (OUTPATIENT)
Dept: LAB | Facility: HOSPITAL | Age: 23
End: 2023-10-18
Payer: COMMERCIAL

## 2023-10-18 DIAGNOSIS — F10.21 ALCOHOL USE DISORDER, SEVERE, IN EARLY REMISSION: Primary | ICD-10-CM

## 2023-10-18 DIAGNOSIS — F10.21 ALCOHOL USE DISORDER, SEVERE, IN EARLY REMISSION: ICD-10-CM

## 2023-10-18 LAB
AMPHET+METHAMPHET UR QL: NEGATIVE
AMPHETAMINES UR QL: NEGATIVE
BARBITURATES UR QL SCN: NEGATIVE
BENZODIAZ UR QL SCN: NEGATIVE
BUPRENORPHINE SERPL-MCNC: NEGATIVE NG/ML
CANNABINOIDS SERPL QL: NEGATIVE
COCAINE UR QL: NEGATIVE
METHADONE UR QL SCN: NEGATIVE
OPIATES UR QL: NEGATIVE
OXYCODONE UR QL SCN: NEGATIVE
PCP UR QL SCN: NEGATIVE
PROPOXYPH UR QL: NEGATIVE
TRICYCLICS UR QL SCN: NEGATIVE

## 2023-10-18 PROCEDURE — H0015 ALCOHOL AND/OR DRUG SERVICES: HCPCS | Performed by: NURSE PRACTITIONER

## 2023-10-18 PROCEDURE — 80306 DRUG TEST PRSMV INSTRMNT: CPT

## 2023-10-19 ENCOUNTER — OFFICE VISIT (OUTPATIENT)
Dept: PSYCHIATRY | Facility: HOSPITAL | Age: 23
End: 2023-10-19
Payer: COMMERCIAL

## 2023-10-19 DIAGNOSIS — F10.21 ALCOHOL USE DISORDER, SEVERE, IN EARLY REMISSION: Primary | ICD-10-CM

## 2023-10-19 PROCEDURE — H0015 ALCOHOL AND/OR DRUG SERVICES: HCPCS | Performed by: NURSE PRACTITIONER

## 2023-10-23 ENCOUNTER — OFFICE VISIT (OUTPATIENT)
Dept: PSYCHIATRY | Facility: HOSPITAL | Age: 23
End: 2023-10-23
Payer: COMMERCIAL

## 2023-10-23 DIAGNOSIS — F10.21 ALCOHOL USE DISORDER, SEVERE, IN EARLY REMISSION: Primary | ICD-10-CM

## 2023-10-23 LAB
1OH-MIDAZOLAM UR CFM-MCNC: NEGATIVE NG/ML
6MAM UR CFM-MCNC: NEGATIVE NG/ML
7AMINOCLONAZEPAM UR CFM-MCNC: NEGATIVE NG/ML
7AMINOCLONAZEPAM UR CFM-MCNC: NEGATIVE NG/ML
A-OH ALPRAZ UR CFM-MCNC: NEGATIVE NG/ML
ALPRAZ UR CFM-MCNC: NEGATIVE NG/ML
AMOBARBITAL UR CFM-MCNC: NEGATIVE NG/ML
AMPHET UR CFM-MCNC: NEGATIVE NG/ML
BUPRENORPHINE UR-MCNC: NEGATIVE NG/ML
BUTABARBITAL UR CFM-MCNC: NEGATIVE NG/ML
BUTALBITAL UR CFM-MCNC: NEGATIVE NG/ML
BZE UR CFM-MCNC: NEGATIVE NG/ML
CARBOXYTHC UR CFM-MCNC: NEGATIVE NG/ML
CARISOPRODOL UR CFM-MCNC: NEGATIVE NG/ML
CODEINE UR CFM-MCNC: NEGATIVE NG/ML
CREATININE: 299.9 MG/DL
DIAZEPAM UR CFM-MCNC: NEGATIVE NG/ML
EDDP UR CFM-MCNC: NEGATIVE NG/ML
ETHYL GLUCURONIDE UR CFM-MCNC: NEGATIVE NG/ML
FENTANYL UR-MCNC: NEGATIVE NG/ML
GABAPENTIN UR CFM-MCNC: NEGATIVE NG/ML
HYDROCODONE UR CFM-MCNC: NEGATIVE NG/ML
HYDROMORPHONE UR CFM-MCNC: NEGATIVE NG/ML
LORAZEPAM UR CFM-MCNC: NEGATIVE NG/ML
MDMA UR CFM-MCNC: NEGATIVE NG/ML
ME-PHENIDATE UR CFM-MCNC: NEGATIVE NG/ML
METHADONE UR CFM-MCNC: NEGATIVE NG/ML
METHAMPHET UR CFM-MCNC: NEGATIVE NG/ML
MIDAZOLAM UR CFM-MCNC: NEGATIVE NG/ML
MORPHINE UR CFM-MCNC: NEGATIVE NG/ML
NORBUPRENORPHINE UR CFM-MCNC: NEGATIVE NG/ML
NORDIAZEPAM UR CFM-MCNC: NEGATIVE NG/ML
NORFENTANYL UR CFM-MCNC: NEGATIVE NG/ML
NORHYDROCODONE UR CFM-MCNC: NEGATIVE NG/ML
NOROXYCODONE UR CFM-MCNC: NEGATIVE NG/ML
OXAZEPAM CTO UR CFM-MCNC: NEGATIVE NG/ML
OXYCODONE SERPL-MCNC: NEGATIVE NG/ML
OXYMORPHONE SERPL-MCNC: NEGATIVE NG/ML
PCP UR CFM-MCNC: NEGATIVE NG/ML
PH: 8 (ref 4.5–9)
PHENOBARB UR CFM-MCNC: NEGATIVE NG/ML
PHENTERMINE: NEGATIVE NG/ML
PPAA UR CFM-MCNC: NEGATIVE NG/ML
PREGABALIN UR CFM-MCNC: NEGATIVE NG/ML
SECOBARBITAL UR CFM-MCNC: NEGATIVE NG/ML
SPECIFIC GRAVITY: 1.03 (ref 1–1.03)
TAPENTADOL UR CFM-MCNC: NEGATIVE NG/ML
TEMAZEPAM UR CFM-MCNC: NEGATIVE NG/ML
TRAMADOL: NEGATIVE NG/ML
ZOLPIDEM PHENYL-4-CARB UR CFM-MCNC: NEGATIVE NG/ML
ZOLPIDEM UR CFM-MCNC: NEGATIVE NG/ML

## 2023-10-23 PROCEDURE — H0015 ALCOHOL AND/OR DRUG SERVICES: HCPCS | Performed by: NURSE PRACTITIONER

## 2023-10-25 ENCOUNTER — LAB (OUTPATIENT)
Dept: LAB | Facility: HOSPITAL | Age: 23
End: 2023-10-25
Payer: COMMERCIAL

## 2023-10-25 ENCOUNTER — OFFICE VISIT (OUTPATIENT)
Dept: PSYCHIATRY | Facility: HOSPITAL | Age: 23
End: 2023-10-25
Payer: COMMERCIAL

## 2023-10-25 DIAGNOSIS — F10.21 ALCOHOL USE DISORDER, SEVERE, IN EARLY REMISSION: Primary | ICD-10-CM

## 2023-10-25 DIAGNOSIS — F10.21 ALCOHOL USE DISORDER, SEVERE, IN EARLY REMISSION: ICD-10-CM

## 2023-10-25 LAB
AMPHET+METHAMPHET UR QL: NEGATIVE
AMPHETAMINES UR QL: NEGATIVE
BARBITURATES UR QL SCN: NEGATIVE
BENZODIAZ UR QL SCN: NEGATIVE
BUPRENORPHINE SERPL-MCNC: NEGATIVE NG/ML
CANNABINOIDS SERPL QL: NEGATIVE
COCAINE UR QL: NEGATIVE
METHADONE UR QL SCN: NEGATIVE
OPIATES UR QL: NEGATIVE
OXYCODONE UR QL SCN: NEGATIVE
PCP UR QL SCN: NEGATIVE
TRICYCLICS UR QL SCN: NEGATIVE

## 2023-10-25 PROCEDURE — 80306 DRUG TEST PRSMV INSTRMNT: CPT

## 2023-10-25 PROCEDURE — H0015 ALCOHOL AND/OR DRUG SERVICES: HCPCS | Performed by: NURSE PRACTITIONER

## 2023-10-26 ENCOUNTER — OFFICE VISIT (OUTPATIENT)
Dept: PSYCHIATRY | Facility: HOSPITAL | Age: 23
End: 2023-10-26
Payer: COMMERCIAL

## 2023-10-26 DIAGNOSIS — F10.21 ALCOHOL USE DISORDER, SEVERE, IN EARLY REMISSION: Primary | ICD-10-CM

## 2023-10-26 PROCEDURE — H0015 ALCOHOL AND/OR DRUG SERVICES: HCPCS | Performed by: NURSE PRACTITIONER

## 2023-10-30 ENCOUNTER — OFFICE VISIT (OUTPATIENT)
Dept: PSYCHIATRY | Facility: HOSPITAL | Age: 23
End: 2023-10-30
Payer: COMMERCIAL

## 2023-10-30 DIAGNOSIS — F10.21 ALCOHOL USE DISORDER, SEVERE, IN EARLY REMISSION: Primary | ICD-10-CM

## 2023-10-30 LAB
1OH-MIDAZOLAM UR CFM-MCNC: NEGATIVE NG/ML
6MAM UR CFM-MCNC: NEGATIVE NG/ML
7AMINOCLONAZEPAM UR CFM-MCNC: NEGATIVE NG/ML
7AMINOCLONAZEPAM UR CFM-MCNC: NEGATIVE NG/ML
A-OH ALPRAZ UR CFM-MCNC: NEGATIVE NG/ML
ALPRAZ UR CFM-MCNC: NEGATIVE NG/ML
AMOBARBITAL UR CFM-MCNC: NEGATIVE NG/ML
AMPHET UR CFM-MCNC: NEGATIVE NG/ML
BUPRENORPHINE UR-MCNC: NEGATIVE NG/ML
BUTABARBITAL UR CFM-MCNC: NEGATIVE NG/ML
BUTALBITAL UR CFM-MCNC: NEGATIVE NG/ML
BZE UR CFM-MCNC: NEGATIVE NG/ML
CARBOXYTHC UR CFM-MCNC: NEGATIVE NG/ML
CARISOPRODOL UR CFM-MCNC: NEGATIVE NG/ML
CODEINE UR CFM-MCNC: NEGATIVE NG/ML
CREATININE: 234.3 MG/DL
DIAZEPAM UR CFM-MCNC: NEGATIVE NG/ML
EDDP UR CFM-MCNC: NEGATIVE NG/ML
ETHYL GLUCURONIDE UR CFM-MCNC: NEGATIVE NG/ML
FENTANYL UR-MCNC: NEGATIVE NG/ML
GABAPENTIN UR CFM-MCNC: NEGATIVE NG/ML
HYDROCODONE UR CFM-MCNC: NEGATIVE NG/ML
HYDROMORPHONE UR CFM-MCNC: NEGATIVE NG/ML
LORAZEPAM UR CFM-MCNC: NEGATIVE NG/ML
MDMA UR CFM-MCNC: NEGATIVE NG/ML
ME-PHENIDATE UR CFM-MCNC: NEGATIVE NG/ML
METHADONE UR CFM-MCNC: NEGATIVE NG/ML
METHAMPHET UR CFM-MCNC: NEGATIVE NG/ML
MIDAZOLAM UR CFM-MCNC: NEGATIVE NG/ML
MORPHINE UR CFM-MCNC: NEGATIVE NG/ML
NORBUPRENORPHINE UR CFM-MCNC: NEGATIVE NG/ML
NORDIAZEPAM UR CFM-MCNC: NEGATIVE NG/ML
NORFENTANYL UR CFM-MCNC: NEGATIVE NG/ML
NORHYDROCODONE UR CFM-MCNC: NEGATIVE NG/ML
NOROXYCODONE UR CFM-MCNC: NEGATIVE NG/ML
OXAZEPAM CTO UR CFM-MCNC: NEGATIVE NG/ML
OXYCODONE SERPL-MCNC: NEGATIVE NG/ML
OXYMORPHONE SERPL-MCNC: NEGATIVE NG/ML
PCP UR CFM-MCNC: NEGATIVE NG/ML
PH: 7 (ref 4.5–9)
PHENOBARB UR CFM-MCNC: NEGATIVE NG/ML
PHENTERMINE: NEGATIVE NG/ML
PPAA UR CFM-MCNC: NEGATIVE NG/ML
PREGABALIN UR CFM-MCNC: NEGATIVE NG/ML
SECOBARBITAL UR CFM-MCNC: NEGATIVE NG/ML
SPECIFIC GRAVITY: 1.03 (ref 1–1.03)
TAPENTADOL UR CFM-MCNC: NEGATIVE NG/ML
TEMAZEPAM UR CFM-MCNC: NEGATIVE NG/ML
TRAMADOL: NEGATIVE NG/ML
ZOLPIDEM PHENYL-4-CARB UR CFM-MCNC: NEGATIVE NG/ML
ZOLPIDEM UR CFM-MCNC: NEGATIVE NG/ML

## 2023-10-30 PROCEDURE — H0015 ALCOHOL AND/OR DRUG SERVICES: HCPCS | Performed by: NURSE PRACTITIONER

## 2023-10-30 NOTE — PROGRESS NOTES
CD IOP GROUP     Date: 10/19/2023  Name: Gerald Ge    Time In: 1800   Time Out: 2100     Number of participants: 8    IOP GROUP NOTE     Data: 3 hour IOP group therapy session (Check-ins, Coping Skills, Relapse Prevention)     Check Ins: Therapist continued facilitation of rapport building strategies between group members. Therapist asked that each patient check in with home life and recovery efforts and identify triggers, cravings, and high risk situations that arise between group sessions. Therapist provided empathy and support during group session.     Session Content/Coping Skills: SHAHNAZ Tuttle student, attended session. Check ins completed by group members. Introductions completed for new group members. Taking the Escalator “Cravings: Basic Principles”, “Coping with Cravings- Skills List” and “Prepare and Practice Emotional, Physical, and Spiritual Self-Care” psychoeducational material reviewed and discussed.     Response: Patient attended class in person. Patient participated in completion of check in form. Patient on check in form answered no to question of “currently or since your last group meeting, have you had any suicidal thoughts or plan or intent to hurt yourself”, and patient also answered no on check in form to question of “currently or since your last group meeting, have you had any homicidal thoughts or plan or intent to hurt others”.     Personal Assessment 0-10 Scale (0-none, 10-high)    Anxiety:  1   Depression:  0   Cravings: 7     Assessment:     ..  Lab on 10/18/2023   Component Date Value Ref Range Status    THC, Screen, Urine 10/18/2023 Negative  Negative Final    Phencyclidine (PCP), Urine 10/18/2023 Negative  Negative Final    Cocaine Screen, Urine 10/18/2023 Negative  Negative Final    Methamphetamine, Ur 10/18/2023 Negative  Negative Final    Opiate Screen 10/18/2023 Negative  Negative Final    Amphetamine Screen, Urine 10/18/2023 Negative  Negative Final    Benzodiazepine Screen,  Urine 10/18/2023 Negative  Negative Final    Tricyclic Antidepressants Screen 10/18/2023 Negative  Negative Final    Methadone Screen, Urine 10/18/2023 Negative  Negative Final    Barbiturates Screen, Urine 10/18/2023 Negative  Negative Final    Oxycodone Screen, Urine 10/18/2023 Negative  Negative Final    Propoxyphene Screen 10/18/2023 Negative  Negative Final    Buprenorphine, Screen, Urine 10/18/2023 Negative  Negative Final    PH 10/18/2023 8.0  4.5 - 9 Final    CREATININE 10/18/2023 299.9  20 - 300 mg/dL mg/dL Final    SPECIFIC GRAVITY 10/18/2023 1.030  1.003 - 1.035 Final    CODEINE 10/18/2023 Negative  50 ng/ml ng/ml Final    HYDROCODONE 10/18/2023 Negative  50 ng/ml ng/ml Final    NORHYDROCODONE 10/18/2023 Negative  50 ng/ml ng/ml Final    HYDROMORPHONE 10/18/2023 Negative  50 ng/ml ng/ml Final    MORPHINE 10/18/2023 Negative  50 ng/ml ng/ml Final    FENTANYL 10/18/2023 Negative  2 ng/ml ng/ml Final    NORFENTANYL 10/18/2023 Negative  10 ng/ml ng/ml Final    METHADONE 10/18/2023 Negative  25 ng/ml ng/ml Final    EDDP 10/18/2023 Negative  50 ng/ml ng/ml Final    OXYCODONE 10/18/2023 Negative  50 ng/ml ng/ml Final    NOROXYCODONE 10/18/2023 Negative  50 ng/ml ng/ml Final    OXYMORPHONE 10/18/2023 Negative  50 ng/ml ng/ml Final    TAPENTADOL 10/18/2023 Negative  50 ng/ml ng/ml Final    TRAMADOL 10/18/2023 Negative  50 ng/ml ng/ml Final    BUPRENORPHINE 10/18/2023 Negative  7.5 ng/ml ng/ml Final    NORBUPRENORPHINE 10/18/2023 Negative  10 ng/ml ng/ml Final    AMOBARBITAL 10/18/2023 Negative  100 ng/ml ng/ml Final    BUTABARBITAL 10/18/2023 Negative  100 ng/ml ng/ml Final    BUTALBITAL 10/18/2023 Negative  100 ng/ml ng/ml Final    PHENOBARBITAL 10/18/2023 Negative  100 ng/ml ng/ml Final    SECOBARBITAL 10/18/2023 Negative  100 ng/ml ng/ml Final    ALPRAZOLAM 10/18/2023 Negative  50 ng/ml ng/ml Final    ALPHA-HYDROXYALPRAZOLAM 10/18/2023 Negative  50 ng/ml ng/ml Final    CLONAZEPAM 10/18/2023 Negative  50 ng/ml  ng/ml Final    7- AMINOCLONAZEPAM 10/18/2023 Negative  50 ng/ml ng/ml Final    DIAZEPAM 10/18/2023 Negative  50 ng/ml ng/ml Final    NORDIAZEPAM 10/18/2023 Negative  50 ng/ml ng/ml Final    LORAZEPAM 10/18/2023 Negative  100 ng/ml ng/ml Final    MIDAZOLAM 10/18/2023 Negative  50 ng/ml ng/ml Final    ALPHA-HYDROXYMIDAZOLAM 10/18/2023 Negative  50 ng/ml ng/ml Final    OXAZEPAM 10/18/2023 Negative  50 ng/ml ng/ml Final    TEMAZEPAM 10/18/2023 Negative  50 ng/ml ng/ml Final    ETG 10/18/2023 Negative  200 ng/ml ng/ml Final    BENZOYLECGONINE 10/18/2023 Negative  100 ng/ml ng/ml Final    6-BUFFY 10/18/2023 Negative  10 ng/ml ng/ml Final    MDMA 10/18/2023 Negative  100 ng/ml ng/ml Final    PCP 10/18/2023 Negative  20 ng/ml ng/ml Final    DELTA-9-THC 10/18/2023 Negative  20 ng/ml ng/ml Final    AMPHETAMINE 10/18/2023 Negative  250 ng/ml ng/ml Final    METHAMPHETAMINE 10/18/2023 Negative  100 ng/ml ng/ml Final    METHYLPHENIDATE 10/18/2023 Negative  10 ng/ml ng/ml Final    PHENTERMINE 10/18/2023 Negative  100 ng/ml ng/ml Final    RITALINIC ACID 10/18/2023 Negative  50 ng/ml ng/ml Final    CARISOPRODOL 10/18/2023 Negative  100 ng/ml ng/ml Final    GABAPENTIN 10/18/2023 Negative  500 ng/ml ng/ml Final    PREGABALIN 10/18/2023 Negative  250 ng/ml ng/ml Final    ZOLPIDEM 10/18/2023 Negative  2 ng/ml ng/ml Final    CARBOXYZOLPIDEM 10/18/2023 Negative  10 ng/ml ng/ml Final       Mental Status Exam  Hygiene:  good  Dress: casual  Attitude: cooperative and agreeable   Motor Activity: appropriate  Eye Contact:  good  Speech: regular rate and rhythm   Mood:  calm and cooperative  Affect:  Appropriate  Thought Processes:  Linear  Thought Content:  Normal  Suicidal Thoughts:  denies  Homicidal Thoughts:  denies  Crisis Safety Plan: Safety plan has been discussed.   Hallucinations:  Unknown to clinician.   Reliability: fair  Insight: fair  Judgement: fair  Impulse Control: fair    Recovery/spiritual support group attendance: No.       Progress toward goal: Not at goal    Prognosis: Fair with Ongoing Treatment     Self-reported number of days sober: Patient reported 117 days on check in form.     Patient will contact this office, call 911 or present to the nearest emergency room should suicidal or homicidal ideations occur.    Impression/Formulation:    ICD-10-CM ICD-9-CM   1. Alcohol use disorder, severe, in early remission  F10.21 303.93       Clinical Maneuvering/Interventions: Therapist utilized a person-centered approach to build rapport with group member. Therapist implemented motivational interviewing techniques to assist client with exploring and resolving ambivalence associated with commitment to change behaviors related to substance use and addiction. Therapist applied cognitive behavioral strategies to facilitate identification of maladaptive patterns of thinking and behavior that contribute to client's risk for continued substance use and relapse. Therapist employed group interaction activities to build rapport among group members, promote sobriety, and emphasize relapse prevention. Therapist promoted safe nonjudgmental environment by providing group members with unconditional positive regard and encouraging group members to comply with group rules and guidelines. Therapist assisted group member with identifying and implementing healthier coping strategies.      Plan:  Continue [facility] IOP Phase I   Aftercare:  [facility] Phase II  Program Assignments:  Personal recovery plan, relapse prevention plan, attendance of recovery support group meetings, exploration of sponsorship, drug/alcohol screens.     Dandre Cannon LCSW  10/30/2023  15:05 EDT

## 2023-10-30 NOTE — PROGRESS NOTES
"[facility] IOP GROUP     Date: 10/16/2023  Name: Gerald Ge    Time In: 1800   Time Out: 2100     Number of participants: 9    IOP GROUP NOTE     Data: 3 hour IOP group therapy session (Check-ins, Coping Skills, Relapse Prevention)     Check Ins: Therapist continued facilitation of rapport building strategies between group members. Therapist asked that each patient check in with home life and recovery efforts and identify triggers, cravings, and high risk situations that arise between group sessions. Therapist provided empathy and support during group session.     Session Content/Coping Skills: Introductions completed by group members. CD-IOP rules sheet explained to group members and group members signed rules sheet. Check in sheets provided and explained to group members. Check ins were completed by group members. Addiction vs. Recovery psychoeducational material provided and Marry, MSW student, shared this with group members. Clinician explored with group members the difference between addiction and recovery and explored behaviors associated with addiction. Clinician explored with group members how recovery entails more just refraining from substance use. Taking the Escalator “the disease concept of addiction” psychoeducational material reviewed and discussed. [employer] video “The Neurobiology of Addiction Addiction 101 in [facility]” ([facility] ADM Board) viewed and discussed.     Response: Patient attended class in person. Patient participated in completion of check in form. Patient on check in form answered no to question of \"currently or in the past 7 days, have you had any suicidal thoughts or plan or intent to hurt yourself”, and patient also answered no on check in form to question of \"currently or in the past 7 days, have you had any homicidal thoughts or plan or intent to hurt others\".     Personal Assessment 0-10 Scale (0-none, 10-high)    Anxiety:  1   Depression:  0   Cravings: 9     Assessment:     ..  No " visits with results within 3 Week(s) from this visit.   Latest known visit with results is:   No results found for any previous visit.       Mental Status Exam  Hygiene:  good  Dress: casual  Attitude: cooperative and agreeable   Motor Activity: appropriate  Eye Contact:  good  Speech: regular rate and rhythm   Mood:  calm and cooperative  Affect:  Appropriate  Thought Processes:  Linear  Thought Content:  Normal  Suicidal Thoughts:  denies  Homicidal Thoughts:  denies  Crisis Safety Plan: Safety plan has been discussed.   Hallucinations:  Unknown to clinician.   Reliability: fair  Insight: fair  Judgement: fair  Impulse Control: fair    Recovery/spiritual support group attendance: No.      Progress toward goal: Not at goal    Prognosis: Fair with Ongoing Treatment     Self-reported number of days sober: Patient reported over 90 days on check in form.     Patient will contact this office, call 911 or present to the nearest emergency room should suicidal or homicidal ideations occur.    Impression/Formulation:    ICD-10-CM ICD-9-CM   1. Alcohol use disorder, severe, in early remission  F10.21 303.93       Clinical Maneuvering/Interventions: Therapist utilized a person-centered approach to build rapport with group member. Therapist implemented motivational interviewing techniques to assist client with exploring and resolving ambivalence associated with commitment to change behaviors related to substance use and addiction. Therapist applied cognitive behavioral strategies to facilitate identification of maladaptive patterns of thinking and behavior that contribute to client's risk for continued substance use and relapse. Therapist employed group interaction activities to build rapport among group members, promote sobriety, and emphasize relapse prevention. Therapist promoted safe nonjudgmental environment by providing group members with unconditional positive regard and encouraging group members to comply with group  rules and guidelines. Therapist assisted group member with identifying and implementing healthier coping strategies.      Plan:  Continue [facility] IOP Phase I   Aftercare:  [facility] Phase II  Program Assignments:  Personal recovery plan, relapse prevention plan, attendance of recovery support group meetings, exploration of sponsorship, drug/alcohol screens.     Dandre Cannon LCSW  10/30/2023  11:02 EDT

## 2023-10-30 NOTE — PROGRESS NOTES
[facility] IOP GROUP     Date: 10/18/2023  Name: Gerald Ge    Time In: 1800   Time Out: 2100     Number of participants: 9    IOP GROUP NOTE     Data: 3 hour IOP group therapy session (Check-ins, Coping Skills, Relapse Prevention)     Check Ins: Therapist continued facilitation of rapport building strategies between group members. Therapist asked that each patient check in with home life and recovery efforts and identify triggers, cravings, and high risk situations that arise between group sessions. Therapist provided empathy and support during group session.     Session Content/Coping Skills: SHAHNAZ Tuttle student, attended session. Check ins completed by group members. Just for today reading reviewed and discussed. Group members participated in a fun facts icebreaker activity to build group cohesion between established group members and new group members. Matrix Model “Recovery Checklist” psychoeducational material reviewed and discussed. Group members explored and discussed which behaviors they anticipate will be easy and which will be challenging.    Response: Patient attended class in person. Patient participated in completion of check in form. Patient on check in form answered no to question of “currently or since your last group meeting, have you had any suicidal thoughts or plan or intent to hurt yourself”, and patient also answered no on check in form to question of “currently or since your last group meeting, have you had any homicidal thoughts or plan or intent to hurt others”.     Personal Assessment 0-10 Scale (0-none, 10-high)    Anxiety:  1   Depression:  0   Cravings: 6     Assessment:     ..  Lab on 10/18/2023   Component Date Value Ref Range Status    THC, Screen, Urine 10/18/2023 Negative  Negative Final    Phencyclidine (PCP), Urine 10/18/2023 Negative  Negative Final    Cocaine Screen, Urine 10/18/2023 Negative  Negative Final    Methamphetamine, Ur 10/18/2023 Negative  Negative Final    Opiate Screen  10/18/2023 Negative  Negative Final    Amphetamine Screen, Urine 10/18/2023 Negative  Negative Final    Benzodiazepine Screen, Urine 10/18/2023 Negative  Negative Final    Tricyclic Antidepressants Screen 10/18/2023 Negative  Negative Final    Methadone Screen, Urine 10/18/2023 Negative  Negative Final    Barbiturates Screen, Urine 10/18/2023 Negative  Negative Final    Oxycodone Screen, Urine 10/18/2023 Negative  Negative Final    Propoxyphene Screen 10/18/2023 Negative  Negative Final    Buprenorphine, Screen, Urine 10/18/2023 Negative  Negative Final    PH 10/18/2023 8.0  4.5 - 9 Final    CREATININE 10/18/2023 299.9  20 - 300 mg/dL mg/dL Final    SPECIFIC GRAVITY 10/18/2023 1.030  1.003 - 1.035 Final    CODEINE 10/18/2023 Negative  50 ng/ml ng/ml Final    HYDROCODONE 10/18/2023 Negative  50 ng/ml ng/ml Final    NORHYDROCODONE 10/18/2023 Negative  50 ng/ml ng/ml Final    HYDROMORPHONE 10/18/2023 Negative  50 ng/ml ng/ml Final    MORPHINE 10/18/2023 Negative  50 ng/ml ng/ml Final    FENTANYL 10/18/2023 Negative  2 ng/ml ng/ml Final    NORFENTANYL 10/18/2023 Negative  10 ng/ml ng/ml Final    METHADONE 10/18/2023 Negative  25 ng/ml ng/ml Final    EDDP 10/18/2023 Negative  50 ng/ml ng/ml Final    OXYCODONE 10/18/2023 Negative  50 ng/ml ng/ml Final    NOROXYCODONE 10/18/2023 Negative  50 ng/ml ng/ml Final    OXYMORPHONE 10/18/2023 Negative  50 ng/ml ng/ml Final    TAPENTADOL 10/18/2023 Negative  50 ng/ml ng/ml Final    TRAMADOL 10/18/2023 Negative  50 ng/ml ng/ml Final    BUPRENORPHINE 10/18/2023 Negative  7.5 ng/ml ng/ml Final    NORBUPRENORPHINE 10/18/2023 Negative  10 ng/ml ng/ml Final    AMOBARBITAL 10/18/2023 Negative  100 ng/ml ng/ml Final    BUTABARBITAL 10/18/2023 Negative  100 ng/ml ng/ml Final    BUTALBITAL 10/18/2023 Negative  100 ng/ml ng/ml Final    PHENOBARBITAL 10/18/2023 Negative  100 ng/ml ng/ml Final    SECOBARBITAL 10/18/2023 Negative  100 ng/ml ng/ml Final    ALPRAZOLAM 10/18/2023 Negative  50 ng/ml  ng/ml Final    ALPHA-HYDROXYALPRAZOLAM 10/18/2023 Negative  50 ng/ml ng/ml Final    CLONAZEPAM 10/18/2023 Negative  50 ng/ml ng/ml Final    7- AMINOCLONAZEPAM 10/18/2023 Negative  50 ng/ml ng/ml Final    DIAZEPAM 10/18/2023 Negative  50 ng/ml ng/ml Final    NORDIAZEPAM 10/18/2023 Negative  50 ng/ml ng/ml Final    LORAZEPAM 10/18/2023 Negative  100 ng/ml ng/ml Final    MIDAZOLAM 10/18/2023 Negative  50 ng/ml ng/ml Final    ALPHA-HYDROXYMIDAZOLAM 10/18/2023 Negative  50 ng/ml ng/ml Final    OXAZEPAM 10/18/2023 Negative  50 ng/ml ng/ml Final    TEMAZEPAM 10/18/2023 Negative  50 ng/ml ng/ml Final    ETG 10/18/2023 Negative  200 ng/ml ng/ml Final    BENZOYLECGONINE 10/18/2023 Negative  100 ng/ml ng/ml Final    6-BUFFY 10/18/2023 Negative  10 ng/ml ng/ml Final    MDMA 10/18/2023 Negative  100 ng/ml ng/ml Final    PCP 10/18/2023 Negative  20 ng/ml ng/ml Final    DELTA-9-THC 10/18/2023 Negative  20 ng/ml ng/ml Final    AMPHETAMINE 10/18/2023 Negative  250 ng/ml ng/ml Final    METHAMPHETAMINE 10/18/2023 Negative  100 ng/ml ng/ml Final    METHYLPHENIDATE 10/18/2023 Negative  10 ng/ml ng/ml Final    PHENTERMINE 10/18/2023 Negative  100 ng/ml ng/ml Final    RITALINIC ACID 10/18/2023 Negative  50 ng/ml ng/ml Final    CARISOPRODOL 10/18/2023 Negative  100 ng/ml ng/ml Final    GABAPENTIN 10/18/2023 Negative  500 ng/ml ng/ml Final    PREGABALIN 10/18/2023 Negative  250 ng/ml ng/ml Final    ZOLPIDEM 10/18/2023 Negative  2 ng/ml ng/ml Final    CARBOXYZOLPIDEM 10/18/2023 Negative  10 ng/ml ng/ml Final       Mental Status Exam  Hygiene:  good  Dress: casual  Attitude: cooperative and agreeable   Motor Activity: appropriate  Eye Contact:  good  Speech: regular rate and rhythm   Mood:  calm and cooperative  Affect:  Appropriate  Thought Processes:  Linear  Thought Content:  Normal  Suicidal Thoughts:  denies  Homicidal Thoughts:  denies  Crisis Safety Plan: Safety plan has been discussed.   Hallucinations:  Unknown to clinician.    Reliability: fair  Insight: fair  Judgement: fair  Impulse Control: fair    Recovery/spiritual support group attendance: No.      Progress toward goal: Not at goal    Prognosis: Fair with Ongoing Treatment     Self-reported number of days sober: Patient reported 3 months on check in form.     Patient will contact this office, call 911 or present to the nearest emergency room should suicidal or homicidal ideations occur.    Impression/Formulation:    ICD-10-CM ICD-9-CM   1. Alcohol use disorder, severe, in early remission  F10.21 303.93       Clinical Maneuvering/Interventions: Therapist utilized a person-centered approach to build rapport with group member. Therapist implemented motivational interviewing techniques to assist client with exploring and resolving ambivalence associated with commitment to change behaviors related to substance use and addiction. Therapist applied cognitive behavioral strategies to facilitate identification of maladaptive patterns of thinking and behavior that contribute to client's risk for continued substance use and relapse. Therapist employed group interaction activities to build rapport among group members, promote sobriety, and emphasize relapse prevention. Therapist promoted safe nonjudgmental environment by providing group members with unconditional positive regard and encouraging group members to comply with group rules and guidelines. Therapist assisted group member with identifying and implementing healthier coping strategies.      Plan:  Continue [facility] IOP Phase I   Aftercare:  [facility] Phase II  Program Assignments:  Personal recovery plan, relapse prevention plan, attendance of recovery support group meetings, exploration of sponsorship, drug/alcohol screens.     Dandre Cannon LCSW  10/30/2023  14:12 EDT

## 2023-10-31 NOTE — PROGRESS NOTES
[facility] IOP GROUP     Date: 10/23/2023  Name: Gerald Ge    Time In: 1800   Time Out: 2056     Number of participants: 11    IOP GROUP NOTE     Data: 3 hour IOP group therapy session (Check-ins, Coping Skills, Relapse Prevention)     Check Ins: Therapist continued facilitation of rapport building strategies between group members. Therapist asked that each patient check in with home life and recovery efforts and identify triggers, cravings, and high risk situations that arise between group sessions. Therapist provided empathy and support during group session.     Session Content/Coping Skills: [employer] student, attended session. Introductions completed for new group members. Check ins completed by group members. [facility]-IOP Treatment plan objectives were reviewed and discussed with group members. Living in Balance- Session One began. Group members participated in self-assessment activity in the Living in Balance session in which they were asked to assess themselves using DSM-5 substance use disorder diagnostic criteria.     Response: Patient attended class in person. Patient participated in completion of check in form. Patient on check in form answered no to question of “currently or since your last group meeting, have you had any suicidal thoughts or plan or intent to hurt yourself”, and patient also answered no on check in form to question of “currently or since your last group meeting, have you had any homicidal thoughts or plan or intent to hurt others”.     Personal Assessment 0-10 Scale (0-none, 10-high)    Anxiety:  1   Depression:  0   Cravings: 7     Assessment:     ..  Lab on 10/18/2023   Component Date Value Ref Range Status    THC, Screen, Urine 10/18/2023 Negative  Negative Final    Phencyclidine (PCP), Urine 10/18/2023 Negative  Negative Final    Cocaine Screen, Urine 10/18/2023 Negative  Negative Final    Methamphetamine, Ur 10/18/2023 Negative  Negative Final    Opiate Screen 10/18/2023 Negative  Negative  Final    Amphetamine Screen, Urine 10/18/2023 Negative  Negative Final    Benzodiazepine Screen, Urine 10/18/2023 Negative  Negative Final    Tricyclic Antidepressants Screen 10/18/2023 Negative  Negative Final    Methadone Screen, Urine 10/18/2023 Negative  Negative Final    Barbiturates Screen, Urine 10/18/2023 Negative  Negative Final    Oxycodone Screen, Urine 10/18/2023 Negative  Negative Final    Propoxyphene Screen 10/18/2023 Negative  Negative Final    Buprenorphine, Screen, Urine 10/18/2023 Negative  Negative Final    PH 10/18/2023 8.0  4.5 - 9 Final    CREATININE 10/18/2023 299.9  20 - 300 mg/dL mg/dL Final    SPECIFIC GRAVITY 10/18/2023 1.030  1.003 - 1.035 Final    CODEINE 10/18/2023 Negative  50 ng/ml ng/ml Final    HYDROCODONE 10/18/2023 Negative  50 ng/ml ng/ml Final    NORHYDROCODONE 10/18/2023 Negative  50 ng/ml ng/ml Final    HYDROMORPHONE 10/18/2023 Negative  50 ng/ml ng/ml Final    MORPHINE 10/18/2023 Negative  50 ng/ml ng/ml Final    FENTANYL 10/18/2023 Negative  2 ng/ml ng/ml Final    NORFENTANYL 10/18/2023 Negative  10 ng/ml ng/ml Final    METHADONE 10/18/2023 Negative  25 ng/ml ng/ml Final    EDDP 10/18/2023 Negative  50 ng/ml ng/ml Final    OXYCODONE 10/18/2023 Negative  50 ng/ml ng/ml Final    NOROXYCODONE 10/18/2023 Negative  50 ng/ml ng/ml Final    OXYMORPHONE 10/18/2023 Negative  50 ng/ml ng/ml Final    TAPENTADOL 10/18/2023 Negative  50 ng/ml ng/ml Final    TRAMADOL 10/18/2023 Negative  50 ng/ml ng/ml Final    BUPRENORPHINE 10/18/2023 Negative  7.5 ng/ml ng/ml Final    NORBUPRENORPHINE 10/18/2023 Negative  10 ng/ml ng/ml Final    AMOBARBITAL 10/18/2023 Negative  100 ng/ml ng/ml Final    BUTABARBITAL 10/18/2023 Negative  100 ng/ml ng/ml Final    BUTALBITAL 10/18/2023 Negative  100 ng/ml ng/ml Final    PHENOBARBITAL 10/18/2023 Negative  100 ng/ml ng/ml Final    SECOBARBITAL 10/18/2023 Negative  100 ng/ml ng/ml Final    ALPRAZOLAM 10/18/2023 Negative  50 ng/ml ng/ml Final     ALPHA-HYDROXYALPRAZOLAM 10/18/2023 Negative  50 ng/ml ng/ml Final    CLONAZEPAM 10/18/2023 Negative  50 ng/ml ng/ml Final    7- AMINOCLONAZEPAM 10/18/2023 Negative  50 ng/ml ng/ml Final    DIAZEPAM 10/18/2023 Negative  50 ng/ml ng/ml Final    NORDIAZEPAM 10/18/2023 Negative  50 ng/ml ng/ml Final    LORAZEPAM 10/18/2023 Negative  100 ng/ml ng/ml Final    MIDAZOLAM 10/18/2023 Negative  50 ng/ml ng/ml Final    ALPHA-HYDROXYMIDAZOLAM 10/18/2023 Negative  50 ng/ml ng/ml Final    OXAZEPAM 10/18/2023 Negative  50 ng/ml ng/ml Final    TEMAZEPAM 10/18/2023 Negative  50 ng/ml ng/ml Final    ETG 10/18/2023 Negative  200 ng/ml ng/ml Final    BENZOYLECGONINE 10/18/2023 Negative  100 ng/ml ng/ml Final    6-BUFFY 10/18/2023 Negative  10 ng/ml ng/ml Final    MDMA 10/18/2023 Negative  100 ng/ml ng/ml Final    PCP 10/18/2023 Negative  20 ng/ml ng/ml Final    DELTA-9-THC 10/18/2023 Negative  20 ng/ml ng/ml Final    AMPHETAMINE 10/18/2023 Negative  250 ng/ml ng/ml Final    METHAMPHETAMINE 10/18/2023 Negative  100 ng/ml ng/ml Final    METHYLPHENIDATE 10/18/2023 Negative  10 ng/ml ng/ml Final    PHENTERMINE 10/18/2023 Negative  100 ng/ml ng/ml Final    RITALINIC ACID 10/18/2023 Negative  50 ng/ml ng/ml Final    CARISOPRODOL 10/18/2023 Negative  100 ng/ml ng/ml Final    GABAPENTIN 10/18/2023 Negative  500 ng/ml ng/ml Final    PREGABALIN 10/18/2023 Negative  250 ng/ml ng/ml Final    ZOLPIDEM 10/18/2023 Negative  2 ng/ml ng/ml Final    CARBOXYZOLPIDEM 10/18/2023 Negative  10 ng/ml ng/ml Final       Mental Status Exam  Hygiene:  good  Dress: casual  Attitude: cooperative and agreeable   Motor Activity: appropriate  Eye Contact:  good  Speech: regular rate and rhythm   Mood:  calm and cooperative  Affect:  Appropriate  Thought Processes:  Linear  Thought Content:  Normal  Suicidal Thoughts:  denies  Homicidal Thoughts:  denies  Crisis Safety Plan: Safety plan has been discussed.   Hallucinations:  Unknown to clinician.   Reliability:  fair  Insight: fair  Judgement: fair  Impulse Control: fair    Recovery/spiritual support group attendance: No.      Progress toward goal: Not at goal    Prognosis: Fair with Ongoing Treatment     Self-reported number of days sober: Patient reported 120 days on check in form.     Patient will contact this office, call 911 or present to the nearest emergency room should suicidal or homicidal ideations occur.    Impression/Formulation:    ICD-10-CM ICD-9-CM   1. Alcohol use disorder, severe, in early remission  F10.21 303.93       Clinical Maneuvering/Interventions: Therapist utilized a person-centered approach to build rapport with group member. Therapist implemented motivational interviewing techniques to assist client with exploring and resolving ambivalence associated with commitment to change behaviors related to substance use and addiction. Therapist applied cognitive behavioral strategies to facilitate identification of maladaptive patterns of thinking and behavior that contribute to client's risk for continued substance use and relapse. Therapist employed group interaction activities to build rapport among group members, promote sobriety, and emphasize relapse prevention. Therapist promoted safe nonjudgmental environment by providing group members with unconditional positive regard and encouraging group members to comply with group rules and guidelines. Therapist assisted group member with identifying and implementing healthier coping strategies.      Plan:  Continue [facility] IOP Phase I   Aftercare:  [facility] Phase II  Program Assignments:  Personal recovery plan, relapse prevention plan, attendance of recovery support group meetings, exploration of sponsorship, drug/alcohol screens.     Dandre Cannon LCSW  10/31/2023  18:51 EDT

## 2023-11-01 ENCOUNTER — OFFICE VISIT (OUTPATIENT)
Dept: PSYCHIATRY | Facility: HOSPITAL | Age: 23
End: 2023-11-01
Payer: COMMERCIAL

## 2023-11-01 DIAGNOSIS — F10.21 ALCOHOL USE DISORDER, SEVERE, IN EARLY REMISSION: Primary | ICD-10-CM

## 2023-11-01 PROCEDURE — H0015 ALCOHOL AND/OR DRUG SERVICES: HCPCS | Performed by: NURSE PRACTITIONER

## 2023-11-01 NOTE — PROGRESS NOTES
[facility] IOP GROUP     Date: 10/25/2023  Name: Gerald Ge    Time In: 1800   Time Out: 2050     Number of participants: 10    IOP GROUP NOTE     Data: 3 hour IOP group therapy session (Check-ins, Coping Skills, Relapse Prevention)     Check Ins: Therapist continued facilitation of rapport building strategies between group members. Therapist asked that each patient check in with home life and recovery efforts and identify triggers, cravings, and high risk situations that arise between group sessions. Therapist provided empathy and support during group session.     Session Content/Coping Skills: [employer] student, attended session. Check ins completed by group members. Living in Balance- Session One completed. Clinician began exploration of coping skills with group members. Clinician discussed with group members the importance of being proactive in recovery. Relapse Prevention Plans were provided to group members. Clinician explained relapse prevention plans to group members and the importance of having a plan in recovery. Clinician encouraged group members to explore coping skills, social supports, as well as consequences of sobriety as well as the consequences of a relapse. Group members were assigned task of completing their relapse prevention plans and brining them to group next session to share with group.     Response: Patient attended class in person. Patient participated in completion of check in form. Patient on check in form answered no to question of “currently or since your last group meeting, have you had any suicidal thoughts or plan or intent to hurt yourself”, and patient also answered no on check in form to question of “currently or since your last group meeting, have you had any homicidal thoughts or plan or intent to hurt others”.     Personal Assessment 0-10 Scale (0-none, 10-high)    Anxiety:  1   Depression:  0   Cravings: 7     Assessment:     ..  Lab on 10/25/2023   Component Date Value Ref Range  Status    THC, Screen, Urine 10/25/2023 Negative  Negative Final    Phencyclidine (PCP), Urine 10/25/2023 Negative  Negative Final    Cocaine Screen, Urine 10/25/2023 Negative  Negative Final    Methamphetamine, Ur 10/25/2023 Negative  Negative Final    Opiate Screen 10/25/2023 Negative  Negative Final    Amphetamine Screen, Urine 10/25/2023 Negative  Negative Final    Benzodiazepine Screen, Urine 10/25/2023 Negative  Negative Final    Tricyclic Antidepressants Screen 10/25/2023 Negative  Negative Final    Methadone Screen, Urine 10/25/2023 Negative  Negative Final    Barbiturates Screen, Urine 10/25/2023 Negative  Negative Final    Oxycodone Screen, Urine 10/25/2023 Negative  Negative Final    Buprenorphine, Screen, Urine 10/25/2023 Negative  Negative Final    PH 10/25/2023 7.0  4.5 - 9 Final    CREATININE 10/25/2023 234.3  20 - 300 mg/dL mg/dL Final    SPECIFIC GRAVITY 10/25/2023 1.026  1.003 - 1.035 Final    CODEINE 10/25/2023 Negative  50 ng/ml ng/ml Final    HYDROCODONE 10/25/2023 Negative  50 ng/ml ng/ml Final    NORHYDROCODONE 10/25/2023 Negative  50 ng/ml ng/ml Final    HYDROMORPHONE 10/25/2023 Negative  50 ng/ml ng/ml Final    MORPHINE 10/25/2023 Negative  50 ng/ml ng/ml Final    FENTANYL 10/25/2023 Negative  2 ng/ml ng/ml Final    NORFENTANYL 10/25/2023 Negative  10 ng/ml ng/ml Final    METHADONE 10/25/2023 Negative  25 ng/ml ng/ml Final    EDDP 10/25/2023 Negative  50 ng/ml ng/ml Final    OXYCODONE 10/25/2023 Negative  50 ng/ml ng/ml Final    NOROXYCODONE 10/25/2023 Negative  50 ng/ml ng/ml Final    OXYMORPHONE 10/25/2023 Negative  50 ng/ml ng/ml Final    TAPENTADOL 10/25/2023 Negative  50 ng/ml ng/ml Final    TRAMADOL 10/25/2023 Negative  50 ng/ml ng/ml Final    BUPRENORPHINE 10/25/2023 Negative  7.5 ng/ml ng/ml Final    NORBUPRENORPHINE 10/25/2023 Negative  10 ng/ml ng/ml Final    AMOBARBITAL 10/25/2023 Negative  100 ng/ml ng/ml Final    BUTABARBITAL 10/25/2023 Negative  100 ng/ml ng/ml Final     BUTALBITAL 10/25/2023 Negative  100 ng/ml ng/ml Final    PHENOBARBITAL 10/25/2023 Negative  100 ng/ml ng/ml Final    SECOBARBITAL 10/25/2023 Negative  100 ng/ml ng/ml Final    ALPRAZOLAM 10/25/2023 Negative  50 ng/ml ng/ml Final    ALPHA-HYDROXYALPRAZOLAM 10/25/2023 Negative  50 ng/ml ng/ml Final    CLONAZEPAM 10/25/2023 Negative  50 ng/ml ng/ml Final    7- AMINOCLONAZEPAM 10/25/2023 Negative  50 ng/ml ng/ml Final    DIAZEPAM 10/25/2023 Negative  50 ng/ml ng/ml Final    NORDIAZEPAM 10/25/2023 Negative  50 ng/ml ng/ml Final    LORAZEPAM 10/25/2023 Negative  100 ng/ml ng/ml Final    MIDAZOLAM 10/25/2023 Negative  50 ng/ml ng/ml Final    ALPHA-HYDROXYMIDAZOLAM 10/25/2023 Negative  50 ng/ml ng/ml Final    OXAZEPAM 10/25/2023 Negative  50 ng/ml ng/ml Final    TEMAZEPAM 10/25/2023 Negative  50 ng/ml ng/ml Final    ETG 10/25/2023 Negative  200 ng/ml ng/ml Final    BENZOYLECGONINE 10/25/2023 Negative  100 ng/ml ng/ml Final    6-BUFFY 10/25/2023 Negative  10 ng/ml ng/ml Final    MDMA 10/25/2023 Negative  100 ng/ml ng/ml Final    PCP 10/25/2023 Negative  20 ng/ml ng/ml Final    DELTA-9-THC 10/25/2023 Negative  20 ng/ml ng/ml Final    AMPHETAMINE 10/25/2023 Negative  250 ng/ml ng/ml Final    METHAMPHETAMINE 10/25/2023 Negative  100 ng/ml ng/ml Final    METHYLPHENIDATE 10/25/2023 Negative  10 ng/ml ng/ml Final    PHENTERMINE 10/25/2023 Negative  100 ng/ml ng/ml Final    RITALINIC ACID 10/25/2023 Negative  50 ng/ml ng/ml Final    CARISOPRODOL 10/25/2023 Negative  100 ng/ml ng/ml Final    GABAPENTIN 10/25/2023 Negative  500 ng/ml ng/ml Final    PREGABALIN 10/25/2023 Negative  250 ng/ml ng/ml Final    ZOLPIDEM 10/25/2023 Negative  2 ng/ml ng/ml Final    CARBOXYZOLPIDEM 10/25/2023 Negative  10 ng/ml ng/ml Final   Lab on 10/18/2023   Component Date Value Ref Range Status    THC, Screen, Urine 10/18/2023 Negative  Negative Final    Phencyclidine (PCP), Urine 10/18/2023 Negative  Negative Final    Cocaine Screen, Urine 10/18/2023  Negative  Negative Final    Methamphetamine, Ur 10/18/2023 Negative  Negative Final    Opiate Screen 10/18/2023 Negative  Negative Final    Amphetamine Screen, Urine 10/18/2023 Negative  Negative Final    Benzodiazepine Screen, Urine 10/18/2023 Negative  Negative Final    Tricyclic Antidepressants Screen 10/18/2023 Negative  Negative Final    Methadone Screen, Urine 10/18/2023 Negative  Negative Final    Barbiturates Screen, Urine 10/18/2023 Negative  Negative Final    Oxycodone Screen, Urine 10/18/2023 Negative  Negative Final    Propoxyphene Screen 10/18/2023 Negative  Negative Final    Buprenorphine, Screen, Urine 10/18/2023 Negative  Negative Final    PH 10/18/2023 8.0  4.5 - 9 Final    CREATININE 10/18/2023 299.9  20 - 300 mg/dL mg/dL Final    SPECIFIC GRAVITY 10/18/2023 1.030  1.003 - 1.035 Final    CODEINE 10/18/2023 Negative  50 ng/ml ng/ml Final    HYDROCODONE 10/18/2023 Negative  50 ng/ml ng/ml Final    NORHYDROCODONE 10/18/2023 Negative  50 ng/ml ng/ml Final    HYDROMORPHONE 10/18/2023 Negative  50 ng/ml ng/ml Final    MORPHINE 10/18/2023 Negative  50 ng/ml ng/ml Final    FENTANYL 10/18/2023 Negative  2 ng/ml ng/ml Final    NORFENTANYL 10/18/2023 Negative  10 ng/ml ng/ml Final    METHADONE 10/18/2023 Negative  25 ng/ml ng/ml Final    EDDP 10/18/2023 Negative  50 ng/ml ng/ml Final    OXYCODONE 10/18/2023 Negative  50 ng/ml ng/ml Final    NOROXYCODONE 10/18/2023 Negative  50 ng/ml ng/ml Final    OXYMORPHONE 10/18/2023 Negative  50 ng/ml ng/ml Final    TAPENTADOL 10/18/2023 Negative  50 ng/ml ng/ml Final    TRAMADOL 10/18/2023 Negative  50 ng/ml ng/ml Final    BUPRENORPHINE 10/18/2023 Negative  7.5 ng/ml ng/ml Final    NORBUPRENORPHINE 10/18/2023 Negative  10 ng/ml ng/ml Final    AMOBARBITAL 10/18/2023 Negative  100 ng/ml ng/ml Final    BUTABARBITAL 10/18/2023 Negative  100 ng/ml ng/ml Final    BUTALBITAL 10/18/2023 Negative  100 ng/ml ng/ml Final    PHENOBARBITAL 10/18/2023 Negative  100 ng/ml ng/ml Final     SECOBARBITAL 10/18/2023 Negative  100 ng/ml ng/ml Final    ALPRAZOLAM 10/18/2023 Negative  50 ng/ml ng/ml Final    ALPHA-HYDROXYALPRAZOLAM 10/18/2023 Negative  50 ng/ml ng/ml Final    CLONAZEPAM 10/18/2023 Negative  50 ng/ml ng/ml Final    7- AMINOCLONAZEPAM 10/18/2023 Negative  50 ng/ml ng/ml Final    DIAZEPAM 10/18/2023 Negative  50 ng/ml ng/ml Final    NORDIAZEPAM 10/18/2023 Negative  50 ng/ml ng/ml Final    LORAZEPAM 10/18/2023 Negative  100 ng/ml ng/ml Final    MIDAZOLAM 10/18/2023 Negative  50 ng/ml ng/ml Final    ALPHA-HYDROXYMIDAZOLAM 10/18/2023 Negative  50 ng/ml ng/ml Final    OXAZEPAM 10/18/2023 Negative  50 ng/ml ng/ml Final    TEMAZEPAM 10/18/2023 Negative  50 ng/ml ng/ml Final    ETG 10/18/2023 Negative  200 ng/ml ng/ml Final    BENZOYLECGONINE 10/18/2023 Negative  100 ng/ml ng/ml Final    6-BUFFY 10/18/2023 Negative  10 ng/ml ng/ml Final    MDMA 10/18/2023 Negative  100 ng/ml ng/ml Final    PCP 10/18/2023 Negative  20 ng/ml ng/ml Final    DELTA-9-THC 10/18/2023 Negative  20 ng/ml ng/ml Final    AMPHETAMINE 10/18/2023 Negative  250 ng/ml ng/ml Final    METHAMPHETAMINE 10/18/2023 Negative  100 ng/ml ng/ml Final    METHYLPHENIDATE 10/18/2023 Negative  10 ng/ml ng/ml Final    PHENTERMINE 10/18/2023 Negative  100 ng/ml ng/ml Final    RITALINIC ACID 10/18/2023 Negative  50 ng/ml ng/ml Final    CARISOPRODOL 10/18/2023 Negative  100 ng/ml ng/ml Final    GABAPENTIN 10/18/2023 Negative  500 ng/ml ng/ml Final    PREGABALIN 10/18/2023 Negative  250 ng/ml ng/ml Final    ZOLPIDEM 10/18/2023 Negative  2 ng/ml ng/ml Final    CARBOXYZOLPIDEM 10/18/2023 Negative  10 ng/ml ng/ml Final       Mental Status Exam  Hygiene:  good  Dress: casual  Attitude: cooperative and agreeable   Motor Activity: appropriate  Eye Contact:  good  Speech: regular rate and rhythm   Mood:  calm and cooperative  Affect:  Appropriate  Thought Processes:  Linear  Thought Content:  Normal  Suicidal Thoughts:  denies  Homicidal Thoughts:   denies  Crisis Safety Plan: Safety plan has been discussed.   Hallucinations:  Unknown to clinician.   Reliability: fair  Insight: fair  Judgement: fair  Impulse Control: fair    Recovery/spiritual support group attendance: No.      Progress toward goal: Not at goal    Prognosis: Fair with Ongoing Treatment     Self-reported number of days sober: Patient reported 121 days on check in form.     Patient will contact this office, call 911 or present to the nearest emergency room should suicidal or homicidal ideations occur.    Impression/Formulation:    ICD-10-CM ICD-9-CM   1. Alcohol use disorder, severe, in early remission  F10.21 303.93       Clinical Maneuvering/Interventions: Therapist utilized a person-centered approach to build rapport with group member. Therapist implemented motivational interviewing techniques to assist client with exploring and resolving ambivalence associated with commitment to change behaviors related to substance use and addiction. Therapist applied cognitive behavioral strategies to facilitate identification of maladaptive patterns of thinking and behavior that contribute to client's risk for continued substance use and relapse. Therapist employed group interaction activities to build rapport among group members, promote sobriety, and emphasize relapse prevention. Therapist promoted safe nonjudgmental environment by providing group members with unconditional positive regard and encouraging group members to comply with group rules and guidelines. Therapist assisted group member with identifying and implementing healthier coping strategies.      Plan:  Continue [facility] IOP Phase I   Aftercare:  [facility] Phase II  Program Assignments:  Personal recovery plan, relapse prevention plan, attendance of recovery support group meetings, exploration of sponsorship, drug/alcohol screens.     Dandre Cannon LCSW  11/1/2023  10:54 EDT

## 2023-11-01 NOTE — PROGRESS NOTES
CD IOP GROUP     Date: 10/30/2023  Name: Gerald Ge    Time In: 1800  Time Out: 2058     Number of participants: 9    IOP GROUP NOTE     Data: 3 hour IOP group therapy session (Check-ins, Coping Skills, Relapse Prevention)     Check Ins: Therapist continued facilitation of rapport building strategies between group members. Therapist asked that each patient check in with home life and recovery efforts and identify triggers, cravings, and high risk situations that arise between group sessions. Therapist provided empathy and support during group session.     Session Content/Coping Skills: [employer] student, attended session. Check ins completed by group members. Clinician explored with group members the emotion of anger. Clinician explored with group members how anger is a natural emotion and explored in what ways this emotion serves a purpose in our lives. Clinician also explained to group how anger must be managed. Clinician explored with group members internal and external risk factors related to relapse. Clinician also explored with group members internal and external protective factors related to recovery. Group members were asked to explore their personal strengths. [employer]uttle student, facilitated group in completion of a strength's recovery shield activity in which group members identified 4 internal and/or external strengths in their lives. Group members shared their strengths with the group. Clinician discussed with group members that right now they are laying the foundation for their long-term recovery and discussed with group members the saying “difficult roads lead to beautiful destinations” and taking things one day at a time. Living in Balance- Session 3 (triggers, cravings, and avoiding relapse) began.    Response: Patient attended class in person. Patient participated in completion of check in form. Patient on check in form answered no to question of “currently or since your last group meeting,  have you had any suicidal thoughts or plan or intent to hurt yourself”, and patient also answered no on check in form to question of “currently or since your last group meeting, have you had any homicidal thoughts or plan or intent to hurt others”.     Personal Assessment 0-10 Scale (0-none, 10-high)    Anxiety:  2   Depression:  0   Cravings: 8     Assessment:     ..  Lab on 10/25/2023   Component Date Value Ref Range Status    THC, Screen, Urine 10/25/2023 Negative  Negative Final    Phencyclidine (PCP), Urine 10/25/2023 Negative  Negative Final    Cocaine Screen, Urine 10/25/2023 Negative  Negative Final    Methamphetamine, Ur 10/25/2023 Negative  Negative Final    Opiate Screen 10/25/2023 Negative  Negative Final    Amphetamine Screen, Urine 10/25/2023 Negative  Negative Final    Benzodiazepine Screen, Urine 10/25/2023 Negative  Negative Final    Tricyclic Antidepressants Screen 10/25/2023 Negative  Negative Final    Methadone Screen, Urine 10/25/2023 Negative  Negative Final    Barbiturates Screen, Urine 10/25/2023 Negative  Negative Final    Oxycodone Screen, Urine 10/25/2023 Negative  Negative Final    Buprenorphine, Screen, Urine 10/25/2023 Negative  Negative Final    PH 10/25/2023 7.0  4.5 - 9 Final    CREATININE 10/25/2023 234.3  20 - 300 mg/dL mg/dL Final    SPECIFIC GRAVITY 10/25/2023 1.026  1.003 - 1.035 Final    CODEINE 10/25/2023 Negative  50 ng/ml ng/ml Final    HYDROCODONE 10/25/2023 Negative  50 ng/ml ng/ml Final    NORHYDROCODONE 10/25/2023 Negative  50 ng/ml ng/ml Final    HYDROMORPHONE 10/25/2023 Negative  50 ng/ml ng/ml Final    MORPHINE 10/25/2023 Negative  50 ng/ml ng/ml Final    FENTANYL 10/25/2023 Negative  2 ng/ml ng/ml Final    NORFENTANYL 10/25/2023 Negative  10 ng/ml ng/ml Final    METHADONE 10/25/2023 Negative  25 ng/ml ng/ml Final    EDDP 10/25/2023 Negative  50 ng/ml ng/ml Final    OXYCODONE 10/25/2023 Negative  50 ng/ml ng/ml Final    NOROXYCODONE 10/25/2023 Negative  50 ng/ml ng/ml  Final    OXYMORPHONE 10/25/2023 Negative  50 ng/ml ng/ml Final    TAPENTADOL 10/25/2023 Negative  50 ng/ml ng/ml Final    TRAMADOL 10/25/2023 Negative  50 ng/ml ng/ml Final    BUPRENORPHINE 10/25/2023 Negative  7.5 ng/ml ng/ml Final    NORBUPRENORPHINE 10/25/2023 Negative  10 ng/ml ng/ml Final    AMOBARBITAL 10/25/2023 Negative  100 ng/ml ng/ml Final    BUTABARBITAL 10/25/2023 Negative  100 ng/ml ng/ml Final    BUTALBITAL 10/25/2023 Negative  100 ng/ml ng/ml Final    PHENOBARBITAL 10/25/2023 Negative  100 ng/ml ng/ml Final    SECOBARBITAL 10/25/2023 Negative  100 ng/ml ng/ml Final    ALPRAZOLAM 10/25/2023 Negative  50 ng/ml ng/ml Final    ALPHA-HYDROXYALPRAZOLAM 10/25/2023 Negative  50 ng/ml ng/ml Final    CLONAZEPAM 10/25/2023 Negative  50 ng/ml ng/ml Final    7- AMINOCLONAZEPAM 10/25/2023 Negative  50 ng/ml ng/ml Final    DIAZEPAM 10/25/2023 Negative  50 ng/ml ng/ml Final    NORDIAZEPAM 10/25/2023 Negative  50 ng/ml ng/ml Final    LORAZEPAM 10/25/2023 Negative  100 ng/ml ng/ml Final    MIDAZOLAM 10/25/2023 Negative  50 ng/ml ng/ml Final    ALPHA-HYDROXYMIDAZOLAM 10/25/2023 Negative  50 ng/ml ng/ml Final    OXAZEPAM 10/25/2023 Negative  50 ng/ml ng/ml Final    TEMAZEPAM 10/25/2023 Negative  50 ng/ml ng/ml Final    ETG 10/25/2023 Negative  200 ng/ml ng/ml Final    BENZOYLECGONINE 10/25/2023 Negative  100 ng/ml ng/ml Final    6-BUFFY 10/25/2023 Negative  10 ng/ml ng/ml Final    MDMA 10/25/2023 Negative  100 ng/ml ng/ml Final    PCP 10/25/2023 Negative  20 ng/ml ng/ml Final    DELTA-9-THC 10/25/2023 Negative  20 ng/ml ng/ml Final    AMPHETAMINE 10/25/2023 Negative  250 ng/ml ng/ml Final    METHAMPHETAMINE 10/25/2023 Negative  100 ng/ml ng/ml Final    METHYLPHENIDATE 10/25/2023 Negative  10 ng/ml ng/ml Final    PHENTERMINE 10/25/2023 Negative  100 ng/ml ng/ml Final    RITALINIC ACID 10/25/2023 Negative  50 ng/ml ng/ml Final    CARISOPRODOL 10/25/2023 Negative  100 ng/ml ng/ml Final    GABAPENTIN 10/25/2023 Negative  500  ng/ml ng/ml Final    PREGABALIN 10/25/2023 Negative  250 ng/ml ng/ml Final    ZOLPIDEM 10/25/2023 Negative  2 ng/ml ng/ml Final    CARBOXYZOLPIDEM 10/25/2023 Negative  10 ng/ml ng/ml Final   Lab on 10/18/2023   Component Date Value Ref Range Status    THC, Screen, Urine 10/18/2023 Negative  Negative Final    Phencyclidine (PCP), Urine 10/18/2023 Negative  Negative Final    Cocaine Screen, Urine 10/18/2023 Negative  Negative Final    Methamphetamine, Ur 10/18/2023 Negative  Negative Final    Opiate Screen 10/18/2023 Negative  Negative Final    Amphetamine Screen, Urine 10/18/2023 Negative  Negative Final    Benzodiazepine Screen, Urine 10/18/2023 Negative  Negative Final    Tricyclic Antidepressants Screen 10/18/2023 Negative  Negative Final    Methadone Screen, Urine 10/18/2023 Negative  Negative Final    Barbiturates Screen, Urine 10/18/2023 Negative  Negative Final    Oxycodone Screen, Urine 10/18/2023 Negative  Negative Final    Propoxyphene Screen 10/18/2023 Negative  Negative Final    Buprenorphine, Screen, Urine 10/18/2023 Negative  Negative Final    PH 10/18/2023 8.0  4.5 - 9 Final    CREATININE 10/18/2023 299.9  20 - 300 mg/dL mg/dL Final    SPECIFIC GRAVITY 10/18/2023 1.030  1.003 - 1.035 Final    CODEINE 10/18/2023 Negative  50 ng/ml ng/ml Final    HYDROCODONE 10/18/2023 Negative  50 ng/ml ng/ml Final    NORHYDROCODONE 10/18/2023 Negative  50 ng/ml ng/ml Final    HYDROMORPHONE 10/18/2023 Negative  50 ng/ml ng/ml Final    MORPHINE 10/18/2023 Negative  50 ng/ml ng/ml Final    FENTANYL 10/18/2023 Negative  2 ng/ml ng/ml Final    NORFENTANYL 10/18/2023 Negative  10 ng/ml ng/ml Final    METHADONE 10/18/2023 Negative  25 ng/ml ng/ml Final    EDDP 10/18/2023 Negative  50 ng/ml ng/ml Final    OXYCODONE 10/18/2023 Negative  50 ng/ml ng/ml Final    NOROXYCODONE 10/18/2023 Negative  50 ng/ml ng/ml Final    OXYMORPHONE 10/18/2023 Negative  50 ng/ml ng/ml Final    TAPENTADOL 10/18/2023 Negative  50 ng/ml ng/ml Final     TRAMADOL 10/18/2023 Negative  50 ng/ml ng/ml Final    BUPRENORPHINE 10/18/2023 Negative  7.5 ng/ml ng/ml Final    NORBUPRENORPHINE 10/18/2023 Negative  10 ng/ml ng/ml Final    AMOBARBITAL 10/18/2023 Negative  100 ng/ml ng/ml Final    BUTABARBITAL 10/18/2023 Negative  100 ng/ml ng/ml Final    BUTALBITAL 10/18/2023 Negative  100 ng/ml ng/ml Final    PHENOBARBITAL 10/18/2023 Negative  100 ng/ml ng/ml Final    SECOBARBITAL 10/18/2023 Negative  100 ng/ml ng/ml Final    ALPRAZOLAM 10/18/2023 Negative  50 ng/ml ng/ml Final    ALPHA-HYDROXYALPRAZOLAM 10/18/2023 Negative  50 ng/ml ng/ml Final    CLONAZEPAM 10/18/2023 Negative  50 ng/ml ng/ml Final    7- AMINOCLONAZEPAM 10/18/2023 Negative  50 ng/ml ng/ml Final    DIAZEPAM 10/18/2023 Negative  50 ng/ml ng/ml Final    NORDIAZEPAM 10/18/2023 Negative  50 ng/ml ng/ml Final    LORAZEPAM 10/18/2023 Negative  100 ng/ml ng/ml Final    MIDAZOLAM 10/18/2023 Negative  50 ng/ml ng/ml Final    ALPHA-HYDROXYMIDAZOLAM 10/18/2023 Negative  50 ng/ml ng/ml Final    OXAZEPAM 10/18/2023 Negative  50 ng/ml ng/ml Final    TEMAZEPAM 10/18/2023 Negative  50 ng/ml ng/ml Final    ETG 10/18/2023 Negative  200 ng/ml ng/ml Final    BENZOYLECGONINE 10/18/2023 Negative  100 ng/ml ng/ml Final    6-BUFFY 10/18/2023 Negative  10 ng/ml ng/ml Final    MDMA 10/18/2023 Negative  100 ng/ml ng/ml Final    PCP 10/18/2023 Negative  20 ng/ml ng/ml Final    DELTA-9-THC 10/18/2023 Negative  20 ng/ml ng/ml Final    AMPHETAMINE 10/18/2023 Negative  250 ng/ml ng/ml Final    METHAMPHETAMINE 10/18/2023 Negative  100 ng/ml ng/ml Final    METHYLPHENIDATE 10/18/2023 Negative  10 ng/ml ng/ml Final    PHENTERMINE 10/18/2023 Negative  100 ng/ml ng/ml Final    RITALINIC ACID 10/18/2023 Negative  50 ng/ml ng/ml Final    CARISOPRODOL 10/18/2023 Negative  100 ng/ml ng/ml Final    GABAPENTIN 10/18/2023 Negative  500 ng/ml ng/ml Final    PREGABALIN 10/18/2023 Negative  250 ng/ml ng/ml Final    ZOLPIDEM 10/18/2023 Negative  2 ng/ml ng/ml  Final    CARBOXYZOLPIDEM 10/18/2023 Negative  10 ng/ml ng/ml Final       Mental Status Exam  Hygiene:  good  Dress: casual  Attitude: cooperative and agreeable   Motor Activity: appropriate  Eye Contact:  good  Speech: regular rate and rhythm   Mood:  calm and cooperative  Affect:  Appropriate  Thought Processes:  Linear  Thought Content:  Normal  Suicidal Thoughts:  denies  Homicidal Thoughts:  denies  Crisis Safety Plan: Safety plan has been discussed.   Hallucinations:  Unknown to clinician.   Reliability: fair  Insight: fair  Judgement: fair  Impulse Control: fair    Recovery/spiritual support group attendance: No.     Progress toward goal: Not at goal    Prognosis: Fair with Ongoing Treatment     Self-reported number of days sober:  Patient reported 125 days on check-in form.    Patient will contact this office, call 911 or present to the nearest emergency room should suicidal or homicidal ideations occur.    Impression/Formulation:    ICD-10-CM ICD-9-CM   1. Alcohol use disorder, severe, in early remission  F10.21 303.93       Clinical Maneuvering/Interventions: Therapist utilized a person-centered approach to build rapport with group member. Therapist implemented motivational interviewing techniques to assist client with exploring and resolving ambivalence associated with commitment to change behaviors related to substance use and addiction. Therapist applied cognitive behavioral strategies to facilitate identification of maladaptive patterns of thinking and behavior that contribute to client's risk for continued substance use and relapse. Therapist employed group interaction activities to build rapport among group members, promote sobriety, and emphasize relapse prevention. Therapist promoted safe nonjudgmental environment by providing group members with unconditional positive regard and encouraging group members to comply with group rules and guidelines. Therapist assisted group member with identifying and  implementing healthier coping strategies.      Plan:  Continue [facility] IOP Phase I   Aftercare:  [facility] Phase II  Program Assignments:  Personal recovery plan, relapse prevention plan, attendance of recovery support group meetings, exploration of sponsorship, drug/alcohol screens.     Dandre Cannon LCSW  11/1/2023  13:48 EDT

## 2023-11-01 NOTE — PROGRESS NOTES
[facility] IOP GROUP     Date: 10/26/2023  Name: Gerald Ge    Time In: 1800   Time Out: 2100     Number of participants: 8    IOP GROUP NOTE     Data: 3 hour IOP group therapy session (Check-ins, Coping Skills, Relapse Prevention)     Check Ins: Therapist continued facilitation of rapport building strategies between group members. Therapist asked that each patient check in with home life and recovery efforts and identify triggers, cravings, and high risk situations that arise between group sessions. Therapist provided empathy and support during group session.     Session Content/Coping Skills: [employer] student, attended session. Check ins completed by group members. Just for Today reading was shared and discussed. Clinician completed individual check in meetings with group members. Group members finalized and shared their relapse prevention plans that was assigned the previous group meeting. IOP Jeopardy group activity completed. Group members were asked to recall addiction and recovery topics discussed during [facility]-IOP over the previous weeks.     Response: Patient attended class in person. Patient participated in completion of check in form. Patient on check in form answered no to question of “currently or since your last group meeting, have you had any suicidal thoughts or plan or intent to hurt yourself”, and patient also answered no on check in form to question of “currently or since your last group meeting, have you had any homicidal thoughts or plan or intent to hurt others”.     Personal Assessment 0-10 Scale (0-none, 10-high)    Anxiety:  4   Depression:  0   Cravings: 8     Assessment:     ..  Lab on 10/25/2023   Component Date Value Ref Range Status    THC, Screen, Urine 10/25/2023 Negative  Negative Final    Phencyclidine (PCP), Urine 10/25/2023 Negative  Negative Final    Cocaine Screen, Urine 10/25/2023 Negative  Negative Final    Methamphetamine, Ur 10/25/2023 Negative  Negative Final    Opiate Screen  10/25/2023 Negative  Negative Final    Amphetamine Screen, Urine 10/25/2023 Negative  Negative Final    Benzodiazepine Screen, Urine 10/25/2023 Negative  Negative Final    Tricyclic Antidepressants Screen 10/25/2023 Negative  Negative Final    Methadone Screen, Urine 10/25/2023 Negative  Negative Final    Barbiturates Screen, Urine 10/25/2023 Negative  Negative Final    Oxycodone Screen, Urine 10/25/2023 Negative  Negative Final    Buprenorphine, Screen, Urine 10/25/2023 Negative  Negative Final    PH 10/25/2023 7.0  4.5 - 9 Final    CREATININE 10/25/2023 234.3  20 - 300 mg/dL mg/dL Final    SPECIFIC GRAVITY 10/25/2023 1.026  1.003 - 1.035 Final    CODEINE 10/25/2023 Negative  50 ng/ml ng/ml Final    HYDROCODONE 10/25/2023 Negative  50 ng/ml ng/ml Final    NORHYDROCODONE 10/25/2023 Negative  50 ng/ml ng/ml Final    HYDROMORPHONE 10/25/2023 Negative  50 ng/ml ng/ml Final    MORPHINE 10/25/2023 Negative  50 ng/ml ng/ml Final    FENTANYL 10/25/2023 Negative  2 ng/ml ng/ml Final    NORFENTANYL 10/25/2023 Negative  10 ng/ml ng/ml Final    METHADONE 10/25/2023 Negative  25 ng/ml ng/ml Final    EDDP 10/25/2023 Negative  50 ng/ml ng/ml Final    OXYCODONE 10/25/2023 Negative  50 ng/ml ng/ml Final    NOROXYCODONE 10/25/2023 Negative  50 ng/ml ng/ml Final    OXYMORPHONE 10/25/2023 Negative  50 ng/ml ng/ml Final    TAPENTADOL 10/25/2023 Negative  50 ng/ml ng/ml Final    TRAMADOL 10/25/2023 Negative  50 ng/ml ng/ml Final    BUPRENORPHINE 10/25/2023 Negative  7.5 ng/ml ng/ml Final    NORBUPRENORPHINE 10/25/2023 Negative  10 ng/ml ng/ml Final    AMOBARBITAL 10/25/2023 Negative  100 ng/ml ng/ml Final    BUTABARBITAL 10/25/2023 Negative  100 ng/ml ng/ml Final    BUTALBITAL 10/25/2023 Negative  100 ng/ml ng/ml Final    PHENOBARBITAL 10/25/2023 Negative  100 ng/ml ng/ml Final    SECOBARBITAL 10/25/2023 Negative  100 ng/ml ng/ml Final    ALPRAZOLAM 10/25/2023 Negative  50 ng/ml ng/ml Final    ALPHA-HYDROXYALPRAZOLAM 10/25/2023 Negative   50 ng/ml ng/ml Final    CLONAZEPAM 10/25/2023 Negative  50 ng/ml ng/ml Final    7- AMINOCLONAZEPAM 10/25/2023 Negative  50 ng/ml ng/ml Final    DIAZEPAM 10/25/2023 Negative  50 ng/ml ng/ml Final    NORDIAZEPAM 10/25/2023 Negative  50 ng/ml ng/ml Final    LORAZEPAM 10/25/2023 Negative  100 ng/ml ng/ml Final    MIDAZOLAM 10/25/2023 Negative  50 ng/ml ng/ml Final    ALPHA-HYDROXYMIDAZOLAM 10/25/2023 Negative  50 ng/ml ng/ml Final    OXAZEPAM 10/25/2023 Negative  50 ng/ml ng/ml Final    TEMAZEPAM 10/25/2023 Negative  50 ng/ml ng/ml Final    ETG 10/25/2023 Negative  200 ng/ml ng/ml Final    BENZOYLECGONINE 10/25/2023 Negative  100 ng/ml ng/ml Final    6-BUFFY 10/25/2023 Negative  10 ng/ml ng/ml Final    MDMA 10/25/2023 Negative  100 ng/ml ng/ml Final    PCP 10/25/2023 Negative  20 ng/ml ng/ml Final    DELTA-9-THC 10/25/2023 Negative  20 ng/ml ng/ml Final    AMPHETAMINE 10/25/2023 Negative  250 ng/ml ng/ml Final    METHAMPHETAMINE 10/25/2023 Negative  100 ng/ml ng/ml Final    METHYLPHENIDATE 10/25/2023 Negative  10 ng/ml ng/ml Final    PHENTERMINE 10/25/2023 Negative  100 ng/ml ng/ml Final    RITALINIC ACID 10/25/2023 Negative  50 ng/ml ng/ml Final    CARISOPRODOL 10/25/2023 Negative  100 ng/ml ng/ml Final    GABAPENTIN 10/25/2023 Negative  500 ng/ml ng/ml Final    PREGABALIN 10/25/2023 Negative  250 ng/ml ng/ml Final    ZOLPIDEM 10/25/2023 Negative  2 ng/ml ng/ml Final    CARBOXYZOLPIDEM 10/25/2023 Negative  10 ng/ml ng/ml Final   Lab on 10/18/2023   Component Date Value Ref Range Status    THC, Screen, Urine 10/18/2023 Negative  Negative Final    Phencyclidine (PCP), Urine 10/18/2023 Negative  Negative Final    Cocaine Screen, Urine 10/18/2023 Negative  Negative Final    Methamphetamine, Ur 10/18/2023 Negative  Negative Final    Opiate Screen 10/18/2023 Negative  Negative Final    Amphetamine Screen, Urine 10/18/2023 Negative  Negative Final    Benzodiazepine Screen, Urine 10/18/2023 Negative  Negative Final    Tricyclic  Antidepressants Screen 10/18/2023 Negative  Negative Final    Methadone Screen, Urine 10/18/2023 Negative  Negative Final    Barbiturates Screen, Urine 10/18/2023 Negative  Negative Final    Oxycodone Screen, Urine 10/18/2023 Negative  Negative Final    Propoxyphene Screen 10/18/2023 Negative  Negative Final    Buprenorphine, Screen, Urine 10/18/2023 Negative  Negative Final    PH 10/18/2023 8.0  4.5 - 9 Final    CREATININE 10/18/2023 299.9  20 - 300 mg/dL mg/dL Final    SPECIFIC GRAVITY 10/18/2023 1.030  1.003 - 1.035 Final    CODEINE 10/18/2023 Negative  50 ng/ml ng/ml Final    HYDROCODONE 10/18/2023 Negative  50 ng/ml ng/ml Final    NORHYDROCODONE 10/18/2023 Negative  50 ng/ml ng/ml Final    HYDROMORPHONE 10/18/2023 Negative  50 ng/ml ng/ml Final    MORPHINE 10/18/2023 Negative  50 ng/ml ng/ml Final    FENTANYL 10/18/2023 Negative  2 ng/ml ng/ml Final    NORFENTANYL 10/18/2023 Negative  10 ng/ml ng/ml Final    METHADONE 10/18/2023 Negative  25 ng/ml ng/ml Final    EDDP 10/18/2023 Negative  50 ng/ml ng/ml Final    OXYCODONE 10/18/2023 Negative  50 ng/ml ng/ml Final    NOROXYCODONE 10/18/2023 Negative  50 ng/ml ng/ml Final    OXYMORPHONE 10/18/2023 Negative  50 ng/ml ng/ml Final    TAPENTADOL 10/18/2023 Negative  50 ng/ml ng/ml Final    TRAMADOL 10/18/2023 Negative  50 ng/ml ng/ml Final    BUPRENORPHINE 10/18/2023 Negative  7.5 ng/ml ng/ml Final    NORBUPRENORPHINE 10/18/2023 Negative  10 ng/ml ng/ml Final    AMOBARBITAL 10/18/2023 Negative  100 ng/ml ng/ml Final    BUTABARBITAL 10/18/2023 Negative  100 ng/ml ng/ml Final    BUTALBITAL 10/18/2023 Negative  100 ng/ml ng/ml Final    PHENOBARBITAL 10/18/2023 Negative  100 ng/ml ng/ml Final    SECOBARBITAL 10/18/2023 Negative  100 ng/ml ng/ml Final    ALPRAZOLAM 10/18/2023 Negative  50 ng/ml ng/ml Final    ALPHA-HYDROXYALPRAZOLAM 10/18/2023 Negative  50 ng/ml ng/ml Final    CLONAZEPAM 10/18/2023 Negative  50 ng/ml ng/ml Final    7- AMINOCLONAZEPAM 10/18/2023 Negative   50 ng/ml ng/ml Final    DIAZEPAM 10/18/2023 Negative  50 ng/ml ng/ml Final    NORDIAZEPAM 10/18/2023 Negative  50 ng/ml ng/ml Final    LORAZEPAM 10/18/2023 Negative  100 ng/ml ng/ml Final    MIDAZOLAM 10/18/2023 Negative  50 ng/ml ng/ml Final    ALPHA-HYDROXYMIDAZOLAM 10/18/2023 Negative  50 ng/ml ng/ml Final    OXAZEPAM 10/18/2023 Negative  50 ng/ml ng/ml Final    TEMAZEPAM 10/18/2023 Negative  50 ng/ml ng/ml Final    ETG 10/18/2023 Negative  200 ng/ml ng/ml Final    BENZOYLECGONINE 10/18/2023 Negative  100 ng/ml ng/ml Final    6-BUFFY 10/18/2023 Negative  10 ng/ml ng/ml Final    MDMA 10/18/2023 Negative  100 ng/ml ng/ml Final    PCP 10/18/2023 Negative  20 ng/ml ng/ml Final    DELTA-9-THC 10/18/2023 Negative  20 ng/ml ng/ml Final    AMPHETAMINE 10/18/2023 Negative  250 ng/ml ng/ml Final    METHAMPHETAMINE 10/18/2023 Negative  100 ng/ml ng/ml Final    METHYLPHENIDATE 10/18/2023 Negative  10 ng/ml ng/ml Final    PHENTERMINE 10/18/2023 Negative  100 ng/ml ng/ml Final    RITALINIC ACID 10/18/2023 Negative  50 ng/ml ng/ml Final    CARISOPRODOL 10/18/2023 Negative  100 ng/ml ng/ml Final    GABAPENTIN 10/18/2023 Negative  500 ng/ml ng/ml Final    PREGABALIN 10/18/2023 Negative  250 ng/ml ng/ml Final    ZOLPIDEM 10/18/2023 Negative  2 ng/ml ng/ml Final    CARBOXYZOLPIDEM 10/18/2023 Negative  10 ng/ml ng/ml Final       Mental Status Exam  Hygiene:  good  Dress: casual  Attitude: cooperative and agreeable   Motor Activity: appropriate  Eye Contact:  good  Speech: regular rate and rhythm   Mood:  calm and cooperative  Affect:  Appropriate  Thought Processes:  Linear  Thought Content:  Normal  Suicidal Thoughts:  denies  Homicidal Thoughts:  denies  Crisis Safety Plan: Safety plan has been discussed.   Hallucinations:  Unknown to clinician.   Reliability: fair  Insight: fair  Judgement: fair  Impulse Control: fair    Recovery/spiritual support group attendance: No.      Progress toward goal: Not at goal    Prognosis: Fair with  Ongoing Treatment     Self-reported number of days sober: Patient reported 122 days on check in form.     Patient will contact this office, call 911 or present to the nearest emergency room should suicidal or homicidal ideations occur.    Impression/Formulation:    ICD-10-CM ICD-9-CM   1. Alcohol use disorder, severe, in early remission  F10.21 303.93       Clinical Maneuvering/Interventions: Therapist utilized a person-centered approach to build rapport with group member. Therapist implemented motivational interviewing techniques to assist client with exploring and resolving ambivalence associated with commitment to change behaviors related to substance use and addiction. Therapist applied cognitive behavioral strategies to facilitate identification of maladaptive patterns of thinking and behavior that contribute to client's risk for continued substance use and relapse. Therapist employed group interaction activities to build rapport among group members, promote sobriety, and emphasize relapse prevention. Therapist promoted safe nonjudgmental environment by providing group members with unconditional positive regard and encouraging group members to comply with group rules and guidelines. Therapist assisted group member with identifying and implementing healthier coping strategies.      Plan:  Continue [facility] IOP Phase I   Aftercare:  [facility] Phase II  Program Assignments:  Personal recovery plan, relapse prevention plan, attendance of recovery support group meetings, exploration of sponsorship, drug/alcohol screens.     Dandre Cannon LCSW  11/1/2023  12:25 EDT

## 2023-11-07 NOTE — PROGRESS NOTES
[facility] IOP GROUP     Date: 11/01/2023  Name: Gerald Ge    Time In: 1800   Time Out: 2053     Number of participants: 10.    IOP GROUP NOTE     Data: 3 hour IOP group therapy session (Check-ins, Coping Skills, Relapse Prevention)     Check Ins: Therapist continued facilitation of rapport building strategies between group members. Therapist asked that each patient check in with home life and recovery efforts and identify triggers, cravings, and high risk situations that arise between group sessions. Therapist provided empathy and support during group session.     Session Content/Coping Skills: [employer] student, attended session. Tasha, , joined for the first part of meeting. Jamal from pharmacy completed presentation on Narcan education. Jamal educated group members on the risk for overdose and the signs of an overdose. Jamal educated group members on how to administer Narcan. Check-ins completed by group members.  shared Just for Today reading. Clinician explored with group members the topic of acceptance and turning things over to a higher power. Living in Balance session 3 (Triggers, Cravings, and Avoiding Relapse) continued. Group members were asked to explore their internal, external, and sensory triggers. Clinician discussed with group members the importance of identifying triggers and then avoiding them. Clinician explored with group members strategies for when triggers cannot be avoided.    Response: Patient attended class in person. Patient participated in completion of check in form. Patient on check in form answered no to question of “currently or since your last group meeting, have you had any suicidal thoughts or plan or intent to hurt yourself”, and patient also answered no on check in form to question of “currently or since your last group meeting, have you had any homicidal thoughts or plan or intent to hurt others”. Patient on check in form reported  utilizing new supports since last group meeting ( Tasha).     Personal Assessment 0-10 Scale (0-none, 10-high)    Anxiety:  6   Depression:  0   Cravings: 7     Assessment:     ..  Lab on 10/25/2023   Component Date Value Ref Range Status    THC, Screen, Urine 10/25/2023 Negative  Negative Final    Phencyclidine (PCP), Urine 10/25/2023 Negative  Negative Final    Cocaine Screen, Urine 10/25/2023 Negative  Negative Final    Methamphetamine, Ur 10/25/2023 Negative  Negative Final    Opiate Screen 10/25/2023 Negative  Negative Final    Amphetamine Screen, Urine 10/25/2023 Negative  Negative Final    Benzodiazepine Screen, Urine 10/25/2023 Negative  Negative Final    Tricyclic Antidepressants Screen 10/25/2023 Negative  Negative Final    Methadone Screen, Urine 10/25/2023 Negative  Negative Final    Barbiturates Screen, Urine 10/25/2023 Negative  Negative Final    Oxycodone Screen, Urine 10/25/2023 Negative  Negative Final    Buprenorphine, Screen, Urine 10/25/2023 Negative  Negative Final    PH 10/25/2023 7.0  4.5 - 9 Final    CREATININE 10/25/2023 234.3  20 - 300 mg/dL mg/dL Final    SPECIFIC GRAVITY 10/25/2023 1.026  1.003 - 1.035 Final    CODEINE 10/25/2023 Negative  50 ng/ml ng/ml Final    HYDROCODONE 10/25/2023 Negative  50 ng/ml ng/ml Final    NORHYDROCODONE 10/25/2023 Negative  50 ng/ml ng/ml Final    HYDROMORPHONE 10/25/2023 Negative  50 ng/ml ng/ml Final    MORPHINE 10/25/2023 Negative  50 ng/ml ng/ml Final    FENTANYL 10/25/2023 Negative  2 ng/ml ng/ml Final    NORFENTANYL 10/25/2023 Negative  10 ng/ml ng/ml Final    METHADONE 10/25/2023 Negative  25 ng/ml ng/ml Final    EDDP 10/25/2023 Negative  50 ng/ml ng/ml Final    OXYCODONE 10/25/2023 Negative  50 ng/ml ng/ml Final    NOROXYCODONE 10/25/2023 Negative  50 ng/ml ng/ml Final    OXYMORPHONE 10/25/2023 Negative  50 ng/ml ng/ml Final    TAPENTADOL 10/25/2023 Negative  50 ng/ml ng/ml Final    TRAMADOL 10/25/2023 Negative  50 ng/ml  ng/ml Final    BUPRENORPHINE 10/25/2023 Negative  7.5 ng/ml ng/ml Final    NORBUPRENORPHINE 10/25/2023 Negative  10 ng/ml ng/ml Final    AMOBARBITAL 10/25/2023 Negative  100 ng/ml ng/ml Final    BUTABARBITAL 10/25/2023 Negative  100 ng/ml ng/ml Final    BUTALBITAL 10/25/2023 Negative  100 ng/ml ng/ml Final    PHENOBARBITAL 10/25/2023 Negative  100 ng/ml ng/ml Final    SECOBARBITAL 10/25/2023 Negative  100 ng/ml ng/ml Final    ALPRAZOLAM 10/25/2023 Negative  50 ng/ml ng/ml Final    ALPHA-HYDROXYALPRAZOLAM 10/25/2023 Negative  50 ng/ml ng/ml Final    CLONAZEPAM 10/25/2023 Negative  50 ng/ml ng/ml Final    7- AMINOCLONAZEPAM 10/25/2023 Negative  50 ng/ml ng/ml Final    DIAZEPAM 10/25/2023 Negative  50 ng/ml ng/ml Final    NORDIAZEPAM 10/25/2023 Negative  50 ng/ml ng/ml Final    LORAZEPAM 10/25/2023 Negative  100 ng/ml ng/ml Final    MIDAZOLAM 10/25/2023 Negative  50 ng/ml ng/ml Final    ALPHA-HYDROXYMIDAZOLAM 10/25/2023 Negative  50 ng/ml ng/ml Final    OXAZEPAM 10/25/2023 Negative  50 ng/ml ng/ml Final    TEMAZEPAM 10/25/2023 Negative  50 ng/ml ng/ml Final    ETG 10/25/2023 Negative  200 ng/ml ng/ml Final    BENZOYLECGONINE 10/25/2023 Negative  100 ng/ml ng/ml Final    6-BUFFY 10/25/2023 Negative  10 ng/ml ng/ml Final    MDMA 10/25/2023 Negative  100 ng/ml ng/ml Final    PCP 10/25/2023 Negative  20 ng/ml ng/ml Final    DELTA-9-THC 10/25/2023 Negative  20 ng/ml ng/ml Final    AMPHETAMINE 10/25/2023 Negative  250 ng/ml ng/ml Final    METHAMPHETAMINE 10/25/2023 Negative  100 ng/ml ng/ml Final    METHYLPHENIDATE 10/25/2023 Negative  10 ng/ml ng/ml Final    PHENTERMINE 10/25/2023 Negative  100 ng/ml ng/ml Final    RITALINIC ACID 10/25/2023 Negative  50 ng/ml ng/ml Final    CARISOPRODOL 10/25/2023 Negative  100 ng/ml ng/ml Final    GABAPENTIN 10/25/2023 Negative  500 ng/ml ng/ml Final    PREGABALIN 10/25/2023 Negative  250 ng/ml ng/ml Final    ZOLPIDEM 10/25/2023 Negative  2 ng/ml ng/ml Final    CARBOXYZOLPIDEM 10/25/2023  Negative  10 ng/ml ng/ml Final   Lab on 10/18/2023   Component Date Value Ref Range Status    THC, Screen, Urine 10/18/2023 Negative  Negative Final    Phencyclidine (PCP), Urine 10/18/2023 Negative  Negative Final    Cocaine Screen, Urine 10/18/2023 Negative  Negative Final    Methamphetamine, Ur 10/18/2023 Negative  Negative Final    Opiate Screen 10/18/2023 Negative  Negative Final    Amphetamine Screen, Urine 10/18/2023 Negative  Negative Final    Benzodiazepine Screen, Urine 10/18/2023 Negative  Negative Final    Tricyclic Antidepressants Screen 10/18/2023 Negative  Negative Final    Methadone Screen, Urine 10/18/2023 Negative  Negative Final    Barbiturates Screen, Urine 10/18/2023 Negative  Negative Final    Oxycodone Screen, Urine 10/18/2023 Negative  Negative Final    Propoxyphene Screen 10/18/2023 Negative  Negative Final    Buprenorphine, Screen, Urine 10/18/2023 Negative  Negative Final    PH 10/18/2023 8.0  4.5 - 9 Final    CREATININE 10/18/2023 299.9  20 - 300 mg/dL mg/dL Final    SPECIFIC GRAVITY 10/18/2023 1.030  1.003 - 1.035 Final    CODEINE 10/18/2023 Negative  50 ng/ml ng/ml Final    HYDROCODONE 10/18/2023 Negative  50 ng/ml ng/ml Final    NORHYDROCODONE 10/18/2023 Negative  50 ng/ml ng/ml Final    HYDROMORPHONE 10/18/2023 Negative  50 ng/ml ng/ml Final    MORPHINE 10/18/2023 Negative  50 ng/ml ng/ml Final    FENTANYL 10/18/2023 Negative  2 ng/ml ng/ml Final    NORFENTANYL 10/18/2023 Negative  10 ng/ml ng/ml Final    METHADONE 10/18/2023 Negative  25 ng/ml ng/ml Final    EDDP 10/18/2023 Negative  50 ng/ml ng/ml Final    OXYCODONE 10/18/2023 Negative  50 ng/ml ng/ml Final    NOROXYCODONE 10/18/2023 Negative  50 ng/ml ng/ml Final    OXYMORPHONE 10/18/2023 Negative  50 ng/ml ng/ml Final    TAPENTADOL 10/18/2023 Negative  50 ng/ml ng/ml Final    TRAMADOL 10/18/2023 Negative  50 ng/ml ng/ml Final    BUPRENORPHINE 10/18/2023 Negative  7.5 ng/ml ng/ml Final    NORBUPRENORPHINE 10/18/2023 Negative  10  ng/ml ng/ml Final    AMOBARBITAL 10/18/2023 Negative  100 ng/ml ng/ml Final    BUTABARBITAL 10/18/2023 Negative  100 ng/ml ng/ml Final    BUTALBITAL 10/18/2023 Negative  100 ng/ml ng/ml Final    PHENOBARBITAL 10/18/2023 Negative  100 ng/ml ng/ml Final    SECOBARBITAL 10/18/2023 Negative  100 ng/ml ng/ml Final    ALPRAZOLAM 10/18/2023 Negative  50 ng/ml ng/ml Final    ALPHA-HYDROXYALPRAZOLAM 10/18/2023 Negative  50 ng/ml ng/ml Final    CLONAZEPAM 10/18/2023 Negative  50 ng/ml ng/ml Final    7- AMINOCLONAZEPAM 10/18/2023 Negative  50 ng/ml ng/ml Final    DIAZEPAM 10/18/2023 Negative  50 ng/ml ng/ml Final    NORDIAZEPAM 10/18/2023 Negative  50 ng/ml ng/ml Final    LORAZEPAM 10/18/2023 Negative  100 ng/ml ng/ml Final    MIDAZOLAM 10/18/2023 Negative  50 ng/ml ng/ml Final    ALPHA-HYDROXYMIDAZOLAM 10/18/2023 Negative  50 ng/ml ng/ml Final    OXAZEPAM 10/18/2023 Negative  50 ng/ml ng/ml Final    TEMAZEPAM 10/18/2023 Negative  50 ng/ml ng/ml Final    ETG 10/18/2023 Negative  200 ng/ml ng/ml Final    BENZOYLECGONINE 10/18/2023 Negative  100 ng/ml ng/ml Final    6-BUFFY 10/18/2023 Negative  10 ng/ml ng/ml Final    MDMA 10/18/2023 Negative  100 ng/ml ng/ml Final    PCP 10/18/2023 Negative  20 ng/ml ng/ml Final    DELTA-9-THC 10/18/2023 Negative  20 ng/ml ng/ml Final    AMPHETAMINE 10/18/2023 Negative  250 ng/ml ng/ml Final    METHAMPHETAMINE 10/18/2023 Negative  100 ng/ml ng/ml Final    METHYLPHENIDATE 10/18/2023 Negative  10 ng/ml ng/ml Final    PHENTERMINE 10/18/2023 Negative  100 ng/ml ng/ml Final    RITALINIC ACID 10/18/2023 Negative  50 ng/ml ng/ml Final    CARISOPRODOL 10/18/2023 Negative  100 ng/ml ng/ml Final    GABAPENTIN 10/18/2023 Negative  500 ng/ml ng/ml Final    PREGABALIN 10/18/2023 Negative  250 ng/ml ng/ml Final    ZOLPIDEM 10/18/2023 Negative  2 ng/ml ng/ml Final    CARBOXYZOLPIDEM 10/18/2023 Negative  10 ng/ml ng/ml Final       Mental Status Exam  Hygiene:  good  Dress: casual  Attitude: cooperative and  agreeable   Motor Activity: appropriate  Eye Contact:  good  Speech: regular rate and rhythm   Mood:  calm and cooperative  Affect:  Appropriate  Thought Processes:  Linear  Thought Content:  Normal  Suicidal Thoughts:  denies  Homicidal Thoughts:  denies  Crisis Safety Plan: Safety plan has been discussed.   Hallucinations:  Unknown to clinician.   Reliability: fair  Insight: fair  Judgement: fair  Impulse Control: fair    Recovery/spiritual support group attendance: No.      Progress toward goal: Not at goal    Prognosis: Fair with Ongoing Treatment     Self-reported number of days sober: Patient reported 126 days on check in form.     Patient will contact this office, call 911 or present to the nearest emergency room should suicidal or homicidal ideations occur.    Impression/Formulation:    ICD-10-CM ICD-9-CM   1. Alcohol use disorder, severe, in early remission  F10.21 303.93       Clinical Maneuvering/Interventions: Therapist utilized a person-centered approach to build rapport with group member. Therapist implemented motivational interviewing techniques to assist client with exploring and resolving ambivalence associated with commitment to change behaviors related to substance use and addiction. Therapist applied cognitive behavioral strategies to facilitate identification of maladaptive patterns of thinking and behavior that contribute to client's risk for continued substance use and relapse. Therapist employed group interaction activities to build rapport among group members, promote sobriety, and emphasize relapse prevention. Therapist promoted safe nonjudgmental environment by providing group members with unconditional positive regard and encouraging group members to comply with group rules and guidelines. Therapist assisted group member with identifying and implementing healthier coping strategies.      Plan:  Continue [facility] IOP Phase I   Aftercare:  [facility] Phase II  Program Assignments:  Personal recovery plan, relapse prevention plan, attendance of recovery support group meetings, exploration of sponsorship, drug/alcohol screens.     Dandre Cannon LCSW  11/7/2023  16:32 EST

## 2023-11-10 ENCOUNTER — OFFICE VISIT (OUTPATIENT)
Dept: PSYCHIATRY | Facility: CLINIC | Age: 23
End: 2023-11-10
Payer: COMMERCIAL

## 2023-11-10 VITALS
SYSTOLIC BLOOD PRESSURE: 132 MMHG | HEART RATE: 89 BPM | WEIGHT: 147.2 LBS | OXYGEN SATURATION: 98 % | HEIGHT: 67 IN | BODY MASS INDEX: 23.1 KG/M2 | DIASTOLIC BLOOD PRESSURE: 84 MMHG

## 2023-11-10 DIAGNOSIS — F10.21 ALCOHOL USE DISORDER, SEVERE, IN EARLY REMISSION: Primary | ICD-10-CM

## 2023-11-10 DIAGNOSIS — F41.1 GENERALIZED ANXIETY DISORDER: ICD-10-CM

## 2023-11-10 RX ORDER — ACAMPROSATE CALCIUM 333 MG/1
666 TABLET, DELAYED RELEASE ORAL 3 TIMES DAILY
Qty: 180 TABLET | Refills: 0 | Status: SHIPPED | OUTPATIENT
Start: 2023-11-10 | End: 2023-12-10

## 2023-11-10 NOTE — PATIENT INSTRUCTIONS
What is this drug used for?   It is used to help keep you alcohol-free.    What do I need to tell my doctor BEFORE I take this drug?   If you are allergic to this drug; any part of this drug; or any other drugs, foods, or substances. Tell your doctor about the allergy and what signs you had.   If you have kidney disease.   This is not a list of all drugs or health problems that interact with this drug.   Tell your doctor and pharmacist about all of your drugs (prescription or OTC, natural products, vitamins) and health problems. You must check to make sure that it is safe for you to take this drug with all of your drugs and health problems. Do not start, stop, or change the dose of any drug without checking with your doctor.    What are some things I need to know or do while I take this drug?   Tell all of your health care providers that you take this drug. This includes your doctors, nurses, pharmacists, and dentists.   Avoid drinking alcohol while taking this drug.   If you have a drink of alcohol, talk with your doctor.   This drug will not lower or get rid of withdrawal signs. Talk with your doctor.   Tell your doctor if you are pregnant, plan on getting pregnant, or are breast-feeding. You will need to talk about the benefits and risks to you and the baby.    What are some side effects that I need to call my doctor about right away?   WARNING/CAUTION: Even though it may be rare, some people may have very bad and sometimes deadly side effects when taking a drug. Tell your doctor or get medical help right away if you have any of the following signs or symptoms that may be related to a very bad side effect:   Signs of an allergic reaction, like rash; hives; itching; red, swollen, blistered, or peeling skin with or without fever; wheezing; tightness in the chest or throat; trouble breathing, swallowing, or talking; unusual hoarseness; or swelling of the mouth, face, lips, tongue, or throat.   Patients who take this  drug may be at a greater risk of having thoughts or actions of suicide. The risk may be greater in people who have had these thoughts or actions in the past. Call the doctor right away if signs like depression, nervousness, restlessness, grouchiness, panic attacks, or changes in mood or actions are new or worse. Call the doctor right away if any thoughts or actions of suicide occur.    What are some other side effects of this drug?   All drugs may cause side effects. However, many people have no side effects or only have minor side effects. Call your doctor or get medical help if any of these side effects or any other side effects bother you or do not go away:   Diarrhea.   Feeling tired or weak.   Decreased appetite.   Trouble sleeping.   These are not all of the side effects that may occur. If you have questions about side effects, call your doctor. Call your doctor for medical advice about side effects.   You may report side effects to your national health agency.    How is this drug best taken?   Use this drug as ordered by your doctor. Read all information given to you. Follow all instructions closely.   Take with or without food as you have been told by your doctor.   Swallow whole. Do not chew, break, or crush.   Keep taking this drug as you have been told by your doctor or other health care provider, even if you feel well.    What do I do if I miss a dose?   Take a missed dose as soon as you think about it.   If it is close to the time for your next dose, skip the missed dose and go back to your normal time.   Do not take 2 doses at the same time or extra doses.    How do I store and/or throw out this drug?   Store at room temperature in a dry place. Do not store in a bathroom.   Keep all drugs in a safe place. Keep all drugs out of the reach of children and pets.   Throw away unused or  drugs. Do not flush down a toilet or pour down a drain unless you are told to do so. Check with your pharmacist if  you have questions about the best way to throw out drugs. There may be drug take-back programs in your area.    General drug facts   If your symptoms or health problems do not get better or if they become worse, call your doctor.   Do not share your drugs with others and do not take anyone else's drugs.   Some drugs may have another patient information leaflet. If you have any questions about this drug, please talk with your doctor, nurse, pharmacist, or other health care provider.   If you think there has been an overdose, call your poison control center or get medical care right away. Be ready to tell or show what was taken, how much, and when it happened.    Last Reviewed [id number]  Consumer Information Use and Disclaimer   This generalized information is a limited summary of diagnosis, treatment, and/or medication information. It is not meant to be comprehensive and should be used as a tool to help the user understand and/or assess potential diagnostic and treatment options. It does NOT include all information about conditions, treatments, medications, side effects, or risks that may apply to a specific patient. It is not intended to be medical advice or a substitute for the medical advice, diagnosis, or treatment of a health care provider based on the health care provider's examination and assessment of a patient's specific and unique circumstances. Patients must speak with a health care provider for complete information about their health, medical questions, and treatment options, including any risks or benefits regarding use of medications. This information does not endorse any treatments or medications as safe, effective, or approved for treating a specific patient. [employer] and its affiliates disclaim any warranty or liability relating to this information or the use thereof. The use of this information is governed by the Terms of Use, available at  https://www.w[employer]ters[employer]ivouwer.com/en/know/clinical-effectiveness-terms.     © 2023 [employer] and its affiliates and/or licensors. All rights reserved.

## 2023-11-10 NOTE — PROGRESS NOTES
Office  Follow Up Visit      Patient Name: Gerald Ge  : 2000   MRN: [id number]     Referring Provider: Vega Nielsen DO    Chief Complaint: Substance use    History of Present Illness:   Gerald Ge is a 23 y.o. male who is here today for follow up related to alcohol use.  Will no longer be able to participate in IOP due to high co-pay.  Maintains previous sober date of 2023.  Has been struggling with more cravings and anxiety over the last few weeks and attributes to increase in stressors due to concerns about high co-pay bill, work stressors, and hearing coworkers talk about drinking. Would like to try MAT for AUD. Feels mood is good overall and is coping ok despite stressors and anxiety. Declines any pharmacological intervention for mood at this time. Denies any SI/HI. No other complaints today.       Triggers: stress, contact with alcohol, seeing it on TV, hearing other talking about drinking    Cravings: daily, worsening    Relapse Prevention: MAT for AUD, peer support, recovery meetings encouraged    Urine Drug Screen (today's visit) discussed: N/A    UDS Confirmation: 10/25/2023 negative for all substances tested    BREANNA (PDMP) Reviewed for Current/Active Medications: No active medication    Past Surgical History:  Past Surgical History:   Procedure Laterality Date    TONSILLECTOMY         Problem List:  Patient Active Problem List   Diagnosis    Acne vulgaris    Attention deficit disorder (ADD) in adult    Reactive airway disease       Allergy:   Allergies   Allergen Reactions    Wellbutrin [Bupropion] Mental Status Change        Current Medications:   Current Outpatient Medications   Medication Sig Dispense Refill    acamprosate (CAMPRAL) 333 MG EC tablet Take 2 tablets by mouth 3 (Three) Times a Day for 30 days. 180 tablet 0     No current facility-administered medications for this visit.       Past Medical History:  Past Medical History:   Diagnosis Date    ADHD (attention  deficit hyperactivity disorder)        Social History:  Social History     Socioeconomic History    Marital status: Single   Tobacco Use    Smoking status: Former     Packs/day: 0.25     Years: 0.50     Additional pack years: 0.00     Total pack years: 0.13     Types: Cigarettes     Start date: 2023     Quit date: 2023     Years since quittin.3     Passive exposure: Never    Smokeless tobacco: Never   Vaping Use    Vaping Use: Former   Substance and Sexual Activity    Alcohol use: Not Currently     Comment: occasionally, states he has a history of alcoholism    Drug use: No    Sexual activity: Defer       Family History:  Family History   Adopted: Yes   Problem Relation Age of Onset    No Known Problems Mother     No Known Problems Father          Subjective      Review of Systems:   Review of Systems   Constitutional:  Negative for chills, fatigue and fever.   Respiratory:  Negative for shortness of breath.    Cardiovascular:  Negative for chest pain.   Gastrointestinal:  Negative for abdominal pain.   Skin:  Negative for skin lesions.   Neurological:  Negative for seizures and confusion.   Psychiatric/Behavioral:  Positive for decreased concentration and stress. Negative for hallucinations, sleep disturbance, suicidal ideas and depressed mood. The patient is nervous/anxious.        PHQ-9 Depression Screening  Little interest or pleasure in doing things? 1-->several days   Feeling down, depressed, or hopeless? 0-->not at all   Trouble falling or staying asleep, or sleeping too much? 0-->not at all   Feeling tired or having little energy? 1-->several days   Poor appetite or overeating? 0-->not at all   Feeling bad about yourself - or that you are a failure or have let yourself or your family down? 0-->not at all   Trouble concentrating on things, such as reading the newspaper or watching television? 0-->not at all   Moving or speaking so slowly that other people could have noticed? Or the opposite - being  so fidgety or restless that you have been moving around a lot more than usual? 0-->not at all   Thoughts that you would be better off dead, or of hurting yourself in some way? 0-->not at all   PHQ-9 Total Score 2   If you checked off any problems, how difficult have these problems made it for you to do your work, take care of things at home, or get along with other people? somewhat difficult        REBEKAH-7 Score:   Feeling nervous, anxious or on edge: More than half the days  Not being able to stop or control worrying: More than half the days  Worrying too much about different things: More than half the days  Trouble Relaxing: Several days  Being so restless that it is hard to sit still: Several days  Feeling afraid as if something awful might happen: Not at all  Becoming easily annoyed or irritable: Several days  REBEKAH 7 Total Score: 9  If you checked any problems, how difficult have these problems made it for you to do your work, take care of things at home, or get along with other people: Somewhat difficult    Patient History:   The following portions of the patient's history were reviewed and updated as appropriate: allergies, current medications, past family history, past medical history, past social history, past surgical history and problem list.     Social:  Social History     Socioeconomic History    Marital status: Single   Tobacco Use    Smoking status: Former     Packs/day: 0.25     Years: 0.50     Additional pack years: 0.00     Total pack years: 0.13     Types: Cigarettes     Start date: 2023     Quit date: 2023     Years since quittin.3     Passive exposure: Never    Smokeless tobacco: Never   Vaping Use    Vaping Use: Former   Substance and Sexual Activity    Alcohol use: Not Currently     Comment: occasionally, states he has a history of alcoholism    Drug use: No    Sexual activity: Defer       Medications:     Current Outpatient Medications:     acamprosate (CAMPRAL) 333 MG EC tablet, Take 2  "tablets by mouth 3 (Three) Times a Day for 30 days., Disp: 180 tablet, Rfl: 0    Objective     Physical Exam  Vitals reviewed.   Constitutional:       General: He is not in acute distress.     Appearance: He is well-developed. He is not ill-appearing.   Pulmonary:      Effort: No respiratory distress.   Neurological:      Mental Status: He is alert and oriented to person, place, and time.      Gait: Gait normal.   Psychiatric:         Attention and Perception: Attention normal.         Mood and Affect: Mood and affect normal.         Speech: Speech normal.         Behavior: Behavior normal. Behavior is cooperative.         Thought Content: Thought content is not paranoid or delusional. Thought content does not include homicidal or suicidal ideation. Thought content does not include homicidal or suicidal plan.         Cognition and Memory: Cognition and memory normal.         Vital Signs:   Vitals:    11/10/23 1026   BP: 132/84   Pulse: 89   SpO2: 98%   Weight: 66.8 kg (147 lb 3.2 oz)   Height: 170.2 cm (67\")     Body mass index is 23.05 kg/m².     Mental Status Exam:   Hygiene:   good  Cooperation:  Cooperative  Eye Contact:  Good  Psychomotor Behavior:  Restless  Affect:  Full range  Mood: normal  Speech:  Normal  Thought Process:  Goal directed  Thought Content:  Normal  Suicidal:  None  Homicidal:  None  Hallucinations:  None  Delusion:  None  Memory:  Intact  Orientation:  Person, Place, Time, and Situation  Reliability:  good  Insight:  Good  Judgement:  Good  Impulse Control:  Fair    Assessment / Plan      -Discussed MAT treatment options. Patient desires to start medication assisted treatment with acamprostate. Patient has agreed to participate in all aspects of treatment including follow-up appointments, counseling, group visits, drug testing, lab work, and other requirements.  -Start acamprosate 666 mg 3 times daily for alcohol use disorder.  Will start at 1 tab 3 times a day for 2 to 3 days and make sure " he tolerates well before increasing dose.  Discussed AE of medication and when to call/RTC.  -Still waiting to hear back from case management on whether or not his insurance is going to fully cover IOP.  Is open to individual therapy and has been encouraged to call his insurance company and check coverage.  -Advisement to take part in and remain active in 12 Step Recovery Meetings, IOP, and/or 1:1 therapy/counseling and to establish/maintain an active relationship with a recovery sponsor.  Recovery meeting list for Eric given in office.  Discussed [facility] [employer] and the [facility] as alternative to [facility]-based groups.  -Declines pharmacological intervention for mood at this time.  Will see if anxiety improves with better control of cravings.    Assessment & Plan   Problems Addressed this Visit    None  Visit Diagnoses       Alcohol use disorder, severe, in early remission    -  Primary    Relevant Medications    acamprosate (CAMPRAL) 333 MG EC tablet    Generalized anxiety disorder        Relevant Medications    acamprosate (CAMPRAL) 333 MG EC tablet          Diagnoses         Codes Comments    Alcohol use disorder, severe, in early remission    -  Primary ICD-10-CM: F10.21  ICD-9-CM: 303.93     Generalized anxiety disorder     ICD-10-CM: F41.1  ICD-9-CM: 300.02           PLAN:  Safety: No acute safety concerns  Risk Assessment: Risk of self-harm acutely is low. Risk of self-harm chronically is also low, but could be further elevated in the event of treatment noncompliance and/or AODA.      TREATMENT PLAN/GOALS: Continue supportive psychotherapy efforts and medications as indicated. Treatment and medication options discussed during today's visit. Patient acknowledged and verbally consented to continue with current treatment plan and was educated on the importance of compliance with treatment and follow-up appointments.      MEDICATION ISSUES:  BREANNA reviewed as expected.  Discussed medication options and  treatment plan of prescribed medication as well as the risks, benefits, and side effects including potential falls, possible impaired driving and metabolic adversities among others. Patient is agreeable to call the office with any worsening of symptoms or onset of side effects. Patient is agreeable to call 911 or go to the nearest ER should he/she begin having SI/HI. No medication side effects or related complaints today.     MEDS ORDERED DURING VISIT:  New Medications Ordered This Visit   Medications    acamprosate (CAMPRAL) 333 MG EC tablet     Sig: Take 2 tablets by mouth 3 (Three) Times a Day for 30 days.     Dispense:  180 tablet     Refill:  0       Return in about 4 weeks (around 12/8/2023) for Follow Up Medication.           This document has been electronically signed by EMILY Hernandez  November 10, 2023 12:22 EST      Part of this note may be an electronic transcription/translation of spoken language to printed text using the Dragon Dictation System.

## 2023-11-20 ENCOUNTER — APPOINTMENT (OUTPATIENT)
Dept: PSYCHIATRY | Facility: HOSPITAL | Age: 23
End: 2023-11-20
Payer: COMMERCIAL

## 2023-11-22 ENCOUNTER — APPOINTMENT (OUTPATIENT)
Dept: PSYCHIATRY | Facility: HOSPITAL | Age: 23
End: 2023-11-22
Payer: COMMERCIAL

## 2023-11-23 ENCOUNTER — APPOINTMENT (OUTPATIENT)
Dept: PSYCHIATRY | Facility: HOSPITAL | Age: 23
End: 2023-11-23
Payer: COMMERCIAL

## 2023-11-27 ENCOUNTER — APPOINTMENT (OUTPATIENT)
Dept: PSYCHIATRY | Facility: HOSPITAL | Age: 23
End: 2023-11-27
Payer: COMMERCIAL

## 2023-11-29 ENCOUNTER — APPOINTMENT (OUTPATIENT)
Dept: PSYCHIATRY | Facility: HOSPITAL | Age: 23
End: 2023-11-29
Payer: COMMERCIAL

## 2023-11-30 ENCOUNTER — APPOINTMENT (OUTPATIENT)
Dept: PSYCHIATRY | Facility: HOSPITAL | Age: 23
End: 2023-11-30
Payer: COMMERCIAL

## 2023-12-04 ENCOUNTER — DOCUMENTATION (OUTPATIENT)
Dept: PSYCHIATRY | Facility: HOSPITAL | Age: 23
End: 2023-12-04
Payer: COMMERCIAL

## 2023-12-04 NOTE — PROGRESS NOTES
[facility] [address], [address]  [phone]    CHEMICAL DEPENDENCY   INTENSIVE OUTPATIENT PROGRAM    PHASE I  DISCHARGE SUMMARY    ATTENDING: EMILY Hernandez.  THERAPIST: Dandre Cannon LCSW.    DATE OF ADMISSION: 10/16/2023 (Date of first CD-IOP class attended).   DATE OF DISCHARGE: 11/2/2023.    REASON FOR ADMISSION: Gerald Ge was referred by Self and admitted to IOP Phase I for Alcohol use disorder, severe, in early remission.     SUMMARY OF CARE, TREATMENT, and SERVICES PROVIDED: Gerald eG was assessed and determined to meet IOP level of care on 10/9/2023. Pt began IOP on 10/16/2023 and attended 8 group therapy sessions. Patient did not attend any CD-IOP Phase One classes following 11/1/2023. Per  Tasha's email on 12/4/2023, patient has decided not to participate in IOP at this time due to insurance concerns. Discharge date from CD-IOP Phase One will be considered 11/2/2023. Patient completed UDS on 10/18/2023 and 10/25/2023 which were both negative.     AFTERCARE PLAN: Patient did not attend any CD-IOP Phase One classes following 11/1/2023. Per  Tasha's email on 12/4/2023, patient has decided not to participate in IOP at this time due to insurance concerns. Patient is welcomed to be reassessed for CD-IOP in the future if he chooses.     Current Outpatient Medications:     acamprosate (CAMPRAL) 333 MG EC tablet, Take 2 tablets by mouth 3 (Three) Times a Day for 30 days., Disp: 180 tablet, Rfl: 0     PROGNOSIS: Poor without continued care.     -Dandre Cannon LCSW.   12/4/2023.

## 2023-12-07 ENCOUNTER — OFFICE VISIT (OUTPATIENT)
Dept: PSYCHIATRY | Facility: CLINIC | Age: 23
End: 2023-12-07
Payer: COMMERCIAL

## 2023-12-07 VITALS
OXYGEN SATURATION: 98 % | HEIGHT: 67 IN | DIASTOLIC BLOOD PRESSURE: 82 MMHG | WEIGHT: 150 LBS | BODY MASS INDEX: 23.54 KG/M2 | SYSTOLIC BLOOD PRESSURE: 128 MMHG | HEART RATE: 92 BPM

## 2023-12-07 DIAGNOSIS — F10.21 ALCOHOL USE DISORDER, SEVERE, IN EARLY REMISSION: Primary | ICD-10-CM

## 2023-12-07 DIAGNOSIS — F41.1 GENERALIZED ANXIETY DISORDER: ICD-10-CM

## 2023-12-07 RX ORDER — ACAMPROSATE CALCIUM 333 MG/1
333 TABLET, DELAYED RELEASE ORAL 3 TIMES DAILY
Qty: 90 TABLET | Refills: 0 | Status: SHIPPED | OUTPATIENT
Start: 2023-12-07 | End: 2024-01-06

## 2023-12-07 NOTE — PROGRESS NOTES
Office  Follow Up Visit      Patient Name: Gerald Ge  : 2000   MRN: [id number]     Referring Provider: Vega Nielsen DO    Chief Complaint: Substance use    History of Present Illness:   Gerald Ge is a 23 y.o. male who is here today for follow up related to alcohol use.  Started on acamprosate at last visit.  Has been taking 330 mg 3 times daily and is doing well on medication.  Denies any adverse effects.  Did not need to go up to full dose as cravings are well controlled at current dose.  Maintains previous sober date of 2023.  Will not be participating in IOP due to high co-pay.  Has attended 1 recovery meeting at [employer].  Dad continues to provide sober support and encouragement.  Anxiety overall has improved with reduction in cravings.  Is having increase in stressors at work.  Continues to decline any pharmacological intervention or individual therapy.  Will not be participating in IOP. Denies any SI/HI. No other complaints today.       Triggers: stress, contact with alcohol, seeing it on TV, hearing other talking about drinking    Cravings: Denies any since starting acamprosate    Relapse Prevention: MAT for AUD, peer support, recovery meetings encouraged    Urine Drug Screen (today's visit) discussed: N/A    UDS Confirmation: 10/25/2023 negative for all substances tested    BREANNA (PDMP) Reviewed for Current/Active Medications: No active medication    Past Surgical History:  Past Surgical History:   Procedure Laterality Date    TONSILLECTOMY         Problem List:  Patient Active Problem List   Diagnosis    Acne vulgaris    Attention deficit disorder (ADD) in adult    Reactive airway disease       Allergy:   Allergies   Allergen Reactions    Wellbutrin [Bupropion] Mental Status Change        Current Medications:   Current Outpatient Medications   Medication Sig Dispense Refill    acamprosate (CAMPRAL) 333 MG EC tablet Take 1 tablet by mouth 3 (Three) Times a Day for 30 days. 90  tablet 0     No current facility-administered medications for this visit.       Past Medical History:  Past Medical History:   Diagnosis Date    ADHD (attention deficit hyperactivity disorder)        Social History:  Social History     Socioeconomic History    Marital status: Single   Tobacco Use    Smoking status: Former     Packs/day: 0.25     Years: 0.50     Additional pack years: 0.00     Total pack years: 0.13     Types: Cigarettes     Start date: 2023     Quit date: 2023     Years since quittin.4     Passive exposure: Never    Smokeless tobacco: Never   Vaping Use    Vaping Use: Former   Substance and Sexual Activity    Alcohol use: Not Currently     Comment: occasionally, states he has a history of alcoholism    Drug use: Never    Sexual activity: Defer       Family History:  Family History   Adopted: Yes   Problem Relation Age of Onset    No Known Problems Mother     No Known Problems Father          Subjective      Review of Systems:   Review of Systems   Constitutional:  Negative for chills, fatigue and fever.   Respiratory:  Negative for shortness of breath.    Cardiovascular:  Negative for chest pain.   Gastrointestinal:  Negative for abdominal pain.   Skin:  Negative for skin lesions.   Neurological:  Negative for seizures and confusion.   Psychiatric/Behavioral:  Positive for decreased concentration and stress. Negative for hallucinations, sleep disturbance, suicidal ideas and depressed mood. The patient is nervous/anxious.        PHQ-9 Depression Screening  Little interest or pleasure in doing things? 0-->not at all   Feeling down, depressed, or hopeless? 0-->not at all   Trouble falling or staying asleep, or sleeping too much? 0-->not at all   Feeling tired or having little energy? 1-->several days   Poor appetite or overeating? 0-->not at all   Feeling bad about yourself - or that you are a failure or have let yourself or your family down? 0-->not at all   Trouble concentrating on things,  such as reading the newspaper or watching television? 0-->not at all   Moving or speaking so slowly that other people could have noticed? Or the opposite - being so fidgety or restless that you have been moving around a lot more than usual? 0-->not at all   Thoughts that you would be better off dead, or of hurting yourself in some way? 0-->not at all   PHQ-9 Total Score 1   If you checked off any problems, how difficult have these problems made it for you to do your work, take care of things at home, or get along with other people? not difficult at all        REBEKAH-7 Score:   Feeling nervous, anxious or on edge: Not at all  Not being able to stop or control worrying: Several days  Worrying too much about different things: Several days  Trouble Relaxing: Not at all  Being so restless that it is hard to sit still: Several days  Feeling afraid as if something awful might happen: Not at all  Becoming easily annoyed or irritable: More than half the days  REBEKAH 7 Total Score: 5  If you checked any problems, how difficult have these problems made it for you to do your work, take care of things at home, or get along with other people: Somewhat difficult    Patient History:   The following portions of the patient's history were reviewed and updated as appropriate: allergies, current medications, past family history, past medical history, past social history, past surgical history and problem list.     Social:  Social History     Socioeconomic History    Marital status: Single   Tobacco Use    Smoking status: Former     Packs/day: 0.25     Years: 0.50     Additional pack years: 0.00     Total pack years: 0.13     Types: Cigarettes     Start date: 2023     Quit date: 2023     Years since quittin.4     Passive exposure: Never    Smokeless tobacco: Never   Vaping Use    Vaping Use: Former   Substance and Sexual Activity    Alcohol use: Not Currently     Comment: occasionally, states he has a history of alcoholism    Drug  "use: Never    Sexual activity: Defer       Medications:     Current Outpatient Medications:     acamprosate (CAMPRAL) 333 MG EC tablet, Take 1 tablet by mouth 3 (Three) Times a Day for 30 days., Disp: 90 tablet, Rfl: 0    Objective     Physical Exam  Vitals reviewed.   Constitutional:       General: He is not in acute distress.     Appearance: He is well-developed. He is not ill-appearing.   Pulmonary:      Effort: No respiratory distress.   Neurological:      Mental Status: He is alert and oriented to person, place, and time.      Gait: Gait normal.   Psychiatric:         Attention and Perception: Attention normal.         Mood and Affect: Mood and affect normal.         Speech: Speech normal.         Behavior: Behavior normal. Behavior is cooperative.         Thought Content: Thought content is not paranoid or delusional. Thought content does not include homicidal or suicidal ideation. Thought content does not include homicidal or suicidal plan.         Cognition and Memory: Cognition and memory normal.         Vital Signs:   Vitals:    12/07/23 1517   BP: 128/82   Pulse: 92   SpO2: 98%   Weight: 68 kg (150 lb)   Height: 170.2 cm (67\")     Body mass index is 23.49 kg/m².     Mental Status Exam: Reviewed 12/7/2023  Hygiene:   good  Cooperation:  Cooperative  Eye Contact:  Good  Psychomotor Behavior:  Restless  Affect:  Full range  Mood: normal  Speech:  Normal  Thought Process:  Goal directed  Thought Content:  Normal  Suicidal:  None  Homicidal:  None  Hallucinations:  None  Delusion:  None  Memory:  Intact  Orientation:  Person, Place, Time, and Situation  Reliability:  good  Insight:  Good  Judgement:  Good  Impulse Control:  Fair    Assessment / Plan    -Continue acamprosate 333 mg for alcohol use disorder.  Will stay at current dosing as he is doing well.  -Advisement to take part in and remain active in recovery meetings and to establish/maintain an active relationship with a recovery sponsor.  Encouraged " more active engagement MAT in meetings and other recovery modalities.  -Declines pharmacological intervention for anxiety.  Feels he is coping well overall.  Declines individual therapy.  -Declines pharmacological intervention for mood at this time.  Will see if anxiety improves with better control of cravings.    Assessment & Plan   Problems Addressed this Visit    None  Visit Diagnoses       Alcohol use disorder, severe, in early remission    -  Primary    Relevant Medications    acamprosate (CAMPRAL) 333 MG EC tablet    Generalized anxiety disorder        Relevant Medications    acamprosate (CAMPRAL) 333 MG EC tablet          Diagnoses         Codes Comments    Alcohol use disorder, severe, in early remission    -  Primary ICD-10-CM: F10.21  ICD-9-CM: 303.93     Generalized anxiety disorder     ICD-10-CM: F41.1  ICD-9-CM: 300.02           PLAN:  Safety: No acute safety concerns  Risk Assessment: Risk of self-harm acutely is low. Risk of self-harm chronically is also low, but could be further elevated in the event of treatment noncompliance and/or AODA.      TREATMENT PLAN/GOALS: Continue supportive psychotherapy efforts and medications as indicated. Treatment and medication options discussed during today's visit. Patient acknowledged and verbally consented to continue with current treatment plan and was educated on the importance of compliance with treatment and follow-up appointments.      MEDICATION ISSUES:  BREANNA reviewed as expected.  Discussed medication options and treatment plan of prescribed medication as well as the risks, benefits, and side effects including potential falls, possible impaired driving and metabolic adversities among others. Patient is agreeable to call the office with any worsening of symptoms or onset of side effects. Patient is agreeable to call 911 or go to the nearest ER should he/she begin having SI/HI. No medication side effects or related complaints today.     MEDS ORDERED DURING  VISIT:  New Medications Ordered This Visit   Medications    acamprosate (CAMPRAL) 333 MG EC tablet     Sig: Take 1 tablet by mouth 3 (Three) Times a Day for 30 days.     Dispense:  90 tablet     Refill:  0       Return in about 4 weeks (around 1/4/2024) for Follow Up Medication.           This document has been electronically signed by EMILY Hernandez  December 7, 2023 15:59 EST      Part of this note may be an electronic transcription/translation of spoken language to printed text using the Dragon Dictation System.

## 2024-01-05 ENCOUNTER — OFFICE VISIT (OUTPATIENT)
Dept: PSYCHIATRY | Facility: CLINIC | Age: 24
End: 2024-01-05
Payer: COMMERCIAL

## 2024-01-05 VITALS
HEIGHT: 67 IN | WEIGHT: 158.4 LBS | SYSTOLIC BLOOD PRESSURE: 140 MMHG | HEART RATE: 94 BPM | DIASTOLIC BLOOD PRESSURE: 82 MMHG | OXYGEN SATURATION: 99 % | BODY MASS INDEX: 24.86 KG/M2

## 2024-01-05 DIAGNOSIS — F10.21 ALCOHOL USE DISORDER, SEVERE, IN EARLY REMISSION: ICD-10-CM

## 2024-01-05 RX ORDER — ACAMPROSATE CALCIUM 333 MG/1
333 TABLET, DELAYED RELEASE ORAL 3 TIMES DAILY
Qty: 90 TABLET | Refills: 0 | Status: SHIPPED | OUTPATIENT
Start: 2024-01-05 | End: 2024-02-04

## 2024-01-05 NOTE — PROGRESS NOTES
Office  Follow Up Visit      Patient Name: Gerald Ge  : 2000   MRN: [id number]     Referring Provider: Veag Nielsen DO    Chief Complaint: Substance use    History of Present Illness:   Gerald Ge is a 23 y.o. male who is here today for follow up related to alcohol use.  Continues on acamprostate 333 mg 3 times daily and is doing well on medication.  Denies any adverse effects today.  Celebrating 197 days sober from alcohol today.  Did have 1 craving since her last follow-up that was triggered by being at a restaurant he used to typically drink in and seeing a drink he would normally drink.  Was able to utilize deep breathing and craving past after about 10 minutes.  Is not attending any recovery meetings but does engage in recovery related content daily.  Mood overall is good.  Stressors at work have improved.  Has had increase in stressors at home due to his car being broken down.  Denies any SI/HI, AVH.  Declines any pharmacological intervention for mood.  No other complaints today.    Triggers: stress, contact with alcohol, seeing it on TV, hearing other talking about drinking    Cravings: Once since last visit    Relapse Prevention: MAT for AUD, peer support, recovery meetings encouraged    Urine Drug Screen (today's visit) discussed: N/A    UDS Confirmation: 10/25/2023 negative for all substances tested    BREANNA (PDMP) Reviewed for Current/Active Medications: No active medication    Past Surgical History:  Past Surgical History:   Procedure Laterality Date    TONSILLECTOMY         Problem List:  Patient Active Problem List   Diagnosis    Acne vulgaris    Attention deficit disorder (ADD) in adult    Reactive airway disease       Allergy:   Allergies   Allergen Reactions    Wellbutrin [Bupropion] Mental Status Change        Current Medications:   Current Outpatient Medications   Medication Sig Dispense Refill    acamprosate (CAMPRAL) 333 MG EC tablet Take 1 tablet by mouth 3 (Three) Times  a Day for 30 days. 90 tablet 0     No current facility-administered medications for this visit.       Past Medical History:  Past Medical History:   Diagnosis Date    ADHD (attention deficit hyperactivity disorder)        Social History:  Social History     Socioeconomic History    Marital status: Single   Tobacco Use    Smoking status: Former     Packs/day: 0.25     Years: 0.50     Additional pack years: 0.00     Total pack years: 0.13     Types: Cigarettes     Start date: 2023     Quit date: 2023     Years since quittin.5     Passive exposure: Never    Smokeless tobacco: Never   Vaping Use    Vaping Use: Former   Substance and Sexual Activity    Alcohol use: Not Currently     Comment: occasionally, states he has a history of alcoholism    Drug use: Not Currently     Types: Marijuana    Sexual activity: Defer       Family History:  Family History   Adopted: Yes   Problem Relation Age of Onset    No Known Problems Mother     No Known Problems Father          Subjective      Review of Systems:   Review of Systems   Constitutional:  Negative for chills, fatigue and fever.   Respiratory:  Negative for shortness of breath.    Cardiovascular:  Negative for chest pain.   Gastrointestinal:  Negative for abdominal pain.   Skin:  Negative for skin lesions.   Neurological:  Negative for seizures and confusion.   Psychiatric/Behavioral:  Positive for stress. Negative for decreased concentration, hallucinations, sleep disturbance, suicidal ideas and depressed mood. The patient is nervous/anxious.        PHQ-9 Depression Screening  Little interest or pleasure in doing things? 0-->not at all   Feeling down, depressed, or hopeless? 0-->not at all   Trouble falling or staying asleep, or sleeping too much? 0-->not at all   Feeling tired or having little energy? 1-->several days   Poor appetite or overeating? 0-->not at all   Feeling bad about yourself - or that you are a failure or have let yourself or your family down?  0-->not at all   Trouble concentrating on things, such as reading the newspaper or watching television? 0-->not at all   Moving or speaking so slowly that other people could have noticed? Or the opposite - being so fidgety or restless that you have been moving around a lot more than usual? 0-->not at all   Thoughts that you would be better off dead, or of hurting yourself in some way? 0-->not at all   PHQ-9 Total Score 1   If you checked off any problems, how difficult have these problems made it for you to do your work, take care of things at home, or get along with other people? not difficult at all        REBEKAH-7 Score:   Feeling nervous, anxious or on edge: Several days  Not being able to stop or control worrying: Several days  Worrying too much about different things: Several days  Trouble Relaxing: Not at all  Being so restless that it is hard to sit still: Several days  Feeling afraid as if something awful might happen: Not at all  Becoming easily annoyed or irritable: Several days  REBEKAH 7 Total Score: 5  If you checked any problems, how difficult have these problems made it for you to do your work, take care of things at home, or get along with other people: Somewhat difficult    Patient History:   The following portions of the patient's history were reviewed and updated as appropriate: allergies, current medications, past family history, past medical history, past social history, past surgical history and problem list.     Social:  Social History     Socioeconomic History    Marital status: Single   Tobacco Use    Smoking status: Former     Packs/day: 0.25     Years: 0.50     Additional pack years: 0.00     Total pack years: 0.13     Types: Cigarettes     Start date: 2023     Quit date: 2023     Years since quittin.5     Passive exposure: Never    Smokeless tobacco: Never   Vaping Use    Vaping Use: Former   Substance and Sexual Activity    Alcohol use: Not Currently     Comment: occasionally, states  "he has a history of alcoholism    Drug use: Not Currently     Types: Marijuana    Sexual activity: Defer       Medications:     Current Outpatient Medications:     acamprosate (CAMPRAL) 333 MG EC tablet, Take 1 tablet by mouth 3 (Three) Times a Day for 30 days., Disp: 90 tablet, Rfl: 0    Objective     Physical Exam  Vitals reviewed.   Constitutional:       General: He is not in acute distress.     Appearance: He is well-developed. He is not ill-appearing.   Pulmonary:      Effort: No respiratory distress.   Neurological:      Mental Status: He is alert and oriented to person, place, and time.      Gait: Gait normal.   Psychiatric:         Attention and Perception: Attention normal.         Mood and Affect: Mood and affect normal.         Speech: Speech normal.         Behavior: Behavior normal. Behavior is cooperative.         Thought Content: Thought content is not paranoid or delusional. Thought content does not include homicidal or suicidal ideation. Thought content does not include homicidal or suicidal plan.         Cognition and Memory: Cognition and memory normal.         Vital Signs:   Vitals:    01/05/24 1022   BP: 140/82   Pulse: 94   SpO2: 99%   Weight: 71.8 kg (158 lb 6.4 oz)   Height: 170.2 cm (67\")       Body mass index is 24.81 kg/m².     Mental Status Exam: Reviewed reviewed 1/5/2024  Hygiene:   good  Cooperation:  Cooperative  Eye Contact:  Good  Psychomotor Behavior:  Restless  Affect:  Full range  Mood: normal  Speech:  Normal  Thought Process:  Goal directed  Thought Content:  Normal  Suicidal:  None  Homicidal:  None  Hallucinations:  None  Delusion:  None  Memory:  Intact  Orientation:  Person, Place, Time, and Situation  Reliability:  good  Insight:  Good  Judgement:  Good  Impulse Control:  Good    Assessment / Plan    -Continue acamprosate 333 mg 3 times daily for alcohol use disorder.  Tolerating medication well.  Working well at current dosing to control most cravings.  -Advisement to " take part in and remain active in recovery meetings and to establish/maintain an active relationship with a recovery sponsor.  Doing well in engaging in different types of recovery related modalities daily.  -Declines pharmacological intervention for anxiety or ADHD.  Feels he is coping well overall.  Declines individual therapy.  -Will reach out to peer support as needed  -Follow-up in 4 weeks per patient preference    Assessment & Plan   Problems Addressed this Visit    None  Visit Diagnoses       Alcohol use disorder, severe, in early remission        Relevant Medications    acamprosate (CAMPRAL) 333 MG EC tablet          Diagnoses         Codes Comments    Alcohol use disorder, severe, in early remission     ICD-10-CM: F10.21  ICD-9-CM: 303.93           PLAN:  Safety: No acute safety concerns  Risk Assessment: Risk of self-harm acutely is low. Risk of self-harm chronically is also low, but could be further elevated in the event of treatment noncompliance and/or AODA.      TREATMENT PLAN/GOALS: Continue supportive psychotherapy efforts and medications as indicated. Treatment and medication options discussed during today's visit. Patient acknowledged and verbally consented to continue with current treatment plan and was educated on the importance of compliance with treatment and follow-up appointments.      MEDICATION ISSUES:  BREANNA reviewed as expected.  Discussed medication options and treatment plan of prescribed medication as well as the risks, benefits, and side effects including potential falls, possible impaired driving and metabolic adversities among others. Patient is agreeable to call the office with any worsening of symptoms or onset of side effects. Patient is agreeable to call 911 or go to the nearest ER should he/she begin having SI/HI. No medication side effects or related complaints today.     MEDS ORDERED DURING VISIT:  New Medications Ordered This Visit   Medications    acamprosate (CAMPRAL) 333  MG EC tablet     Sig: Take 1 tablet by mouth 3 (Three) Times a Day for 30 days.     Dispense:  90 tablet     Refill:  0       Return in about 4 weeks (around 2/2/2024) for Follow Up Medication.           This document has been electronically signed by EMILY Hernandez  January 5, 2024 11:15 EST      Part of this note may be an electronic transcription/translation of spoken language to printed text using the Dragon Dictation System.

## 2024-06-10 DIAGNOSIS — F10.21 ALCOHOL USE DISORDER, SEVERE, IN EARLY REMISSION: ICD-10-CM

## 2024-06-11 RX ORDER — ACAMPROSATE CALCIUM 333 MG/1
TABLET, DELAYED RELEASE ORAL
Qty: 90 TABLET | Refills: 0 | Status: SHIPPED | OUTPATIENT
Start: 2024-06-11

## 2024-07-01 ENCOUNTER — OFFICE VISIT (OUTPATIENT)
Dept: PSYCHIATRY | Facility: CLINIC | Age: 24
End: 2024-07-01
Payer: COMMERCIAL

## 2024-07-01 VITALS
DIASTOLIC BLOOD PRESSURE: 86 MMHG | BODY MASS INDEX: 23.39 KG/M2 | WEIGHT: 149 LBS | HEIGHT: 67 IN | OXYGEN SATURATION: 97 % | HEART RATE: 67 BPM | SYSTOLIC BLOOD PRESSURE: 140 MMHG

## 2024-07-01 DIAGNOSIS — F10.21 ALCOHOL USE DISORDER, SEVERE, IN EARLY REMISSION: ICD-10-CM

## 2024-07-01 PROCEDURE — 99213 OFFICE O/P EST LOW 20 MIN: CPT | Performed by: NURSE PRACTITIONER

## 2024-07-01 RX ORDER — ACAMPROSATE CALCIUM 333 MG/1
666 TABLET, DELAYED RELEASE ORAL 2 TIMES DAILY
Qty: 120 TABLET | Refills: 2 | Status: SHIPPED | OUTPATIENT
Start: 2024-07-01

## 2024-07-01 NOTE — PROGRESS NOTES
Office  Follow Up Visit      Patient Name: Gerald Ge  : 2000   MRN: [id number]     Referring Provider: Vega Nielsen DO    Chief Complaint: Substance use    History of Present Illness:   Gerald Ge is a 24 y.o. male who is here today for follow up related to alcohol use.  He continues to do well in his sobriety and is celebrating maintains previous sober day.  Has been taking acamprosate 666 mg once, sometimes twice daily.  Often forgets the evening dose.  Medication is working well to control cravings for alcohol even at the lower dose.  Not having any adverse effects.  Not attending any recovery meetings but has a strong sober support network he reaches out to.  Continues to report mood is good overall.  Has some stressors in the home but is coping well.  Sleeping well at night.  Denies any SI/HI, AVH.  No other complaints today.    Triggers: stress, contact with alcohol, seeing it on TV, hearing other talking about drinking    Cravings: Denies any recent cravings    Relapse Prevention: MAT for AUD, peer support, recovery meetings encouraged    Urine Drug Screen (today's visit) discussed: N/A    UDS Confirmation: 10/25/2023 negative for all substances tested    BREANNA (PDMP) Reviewed for Current/Active Medications: No active medication    Past Surgical History:  Past Surgical History:   Procedure Laterality Date    TONSILLECTOMY         Problem List:  Patient Active Problem List   Diagnosis    Acne vulgaris    Attention deficit disorder (ADD) in adult    Reactive airway disease       Allergy:   Allergies   Allergen Reactions    Wellbutrin [Bupropion] Mental Status Change        Current Medications:   Current Outpatient Medications   Medication Sig Dispense Refill    acamprosate (CAMPRAL) 333 MG EC tablet Take 2 tablets by mouth 2 (Two) Times a Day. 120 tablet 2     No current facility-administered medications for this visit.       Past Medical History:  Past Medical History:   Diagnosis Date     ADHD (attention deficit hyperactivity disorder)        Social History:  Social History     Socioeconomic History    Marital status: Single   Tobacco Use    Smoking status: Former     Current packs/day: 0.00     Average packs/day: 0.3 packs/day for 0.5 years (0.1 ttl pk-yrs)     Types: Cigarettes     Start date: 2023     Quit date: 2023     Years since quittin.0     Passive exposure: Past    Smokeless tobacco: Never    Tobacco comments:     Currently using Nicotine Pouches-per Pt. 24   Vaping Use    Vaping status: Former   Substance and Sexual Activity    Alcohol use: Not Currently     Comment: occasionally, states he has a history of alcoholism    Drug use: Not Currently     Types: Marijuana    Sexual activity: Defer       Family History:  Family History   Adopted: Yes   Problem Relation Age of Onset    No Known Problems Mother     No Known Problems Father          Subjective      Review of Systems:   Review of Systems   Constitutional:  Negative for chills, fatigue and fever.   Respiratory:  Negative for shortness of breath.    Cardiovascular:  Negative for chest pain.   Gastrointestinal:  Negative for abdominal pain.   Skin:  Negative for skin lesions.   Neurological:  Negative for seizures and confusion.   Psychiatric/Behavioral:  Positive for stress. Negative for decreased concentration, hallucinations, sleep disturbance, suicidal ideas and depressed mood. The patient is not nervous/anxious.        PHQ-9 Depression Screening  Little interest or pleasure in doing things? 0-->not at all   Feeling down, depressed, or hopeless? 0-->not at all   Trouble falling or staying asleep, or sleeping too much? 0-->not at all   Feeling tired or having little energy? 1-->several days   Poor appetite or overeating? 0-->not at all   Feeling bad about yourself - or that you are a failure or have let yourself or your family down? 0-->not at all   Trouble concentrating on things, such as reading the newspaper or  watching television? 0-->not at all   Moving or speaking so slowly that other people could have noticed? Or the opposite - being so fidgety or restless that you have been moving around a lot more than usual? 0-->not at all   Thoughts that you would be better off dead, or of hurting yourself in some way? 0-->not at all   PHQ-9 Total Score 1   If you checked off any problems, how difficult have these problems made it for you to do your work, take care of things at home, or get along with other people? not difficult at all        REBEKAH-7 Score:   Feeling nervous, anxious or on edge: Not at all  Not being able to stop or control worrying: Not at all  Worrying too much about different things: Not at all  Trouble Relaxing: Not at all  Being so restless that it is hard to sit still: Not at all  Feeling afraid as if something awful might happen: Several days  Becoming easily annoyed or irritable: More than half the days  REBEKAH 7 Total Score: 3  If you checked any problems, how difficult have these problems made it for you to do your work, take care of things at home, or get along with other people: Not difficult at all    Patient History:   The following portions of the patient's history were reviewed and updated as appropriate: allergies, current medications, past family history, past medical history, past social history, past surgical history and problem list.     Social:  Social History     Socioeconomic History    Marital status: Single   Tobacco Use    Smoking status: Former     Current packs/day: 0.00     Average packs/day: 0.3 packs/day for 0.5 years (0.1 ttl pk-yrs)     Types: Cigarettes     Start date: 2023     Quit date: 2023     Years since quittin.0     Passive exposure: Past    Smokeless tobacco: Never    Tobacco comments:     Currently using Nicotine Pouches-per Pt. 24   Vaping Use    Vaping status: Former   Substance and Sexual Activity    Alcohol use: Not Currently     Comment: occasionally, states  "he has a history of alcoholism    Drug use: Not Currently     Types: Marijuana    Sexual activity: Defer       Medications:     Current Outpatient Medications:     acamprosate (CAMPRAL) 333 MG EC tablet, Take 2 tablets by mouth 2 (Two) Times a Day., Disp: 120 tablet, Rfl: 2    Objective     Physical Exam  Vitals reviewed.   Constitutional:       General: He is not in acute distress.     Appearance: He is well-developed. He is not ill-appearing.   Pulmonary:      Effort: No respiratory distress.   Neurological:      Mental Status: He is alert and oriented to person, place, and time.      Gait: Gait normal.   Psychiatric:         Attention and Perception: Attention normal.         Mood and Affect: Mood and affect normal.         Speech: Speech normal.         Behavior: Behavior normal. Behavior is cooperative.         Thought Content: Thought content is not paranoid or delusional. Thought content does not include homicidal or suicidal ideation. Thought content does not include homicidal or suicidal plan.         Cognition and Memory: Cognition and memory normal.         Vital Signs:   Vitals:    07/01/24 1007   BP: 140/86   Pulse: 67   SpO2: 97%   Weight: 67.6 kg (149 lb)   Height: 170.2 cm (67\")       Body mass index is 23.34 kg/m².     Mental Status Exam: Reviewed 7/2/2024  Hygiene:   good  Cooperation:  Cooperative  Eye Contact:  Good  Psychomotor Behavior:  Restless  Affect:  Full range  Mood: normal  Speech:  Normal  Thought Process:  Goal directed  Thought Content:  Normal  Suicidal:  None  Homicidal:  None  Hallucinations:  None  Delusion:  None  Memory:  Intact  Orientation:  Person, Place, Time, and Situation  Reliability:  good  Insight:  Good  Judgement:  Good  Impulse Control:  Good    Assessment / Plan    -Gerald continues to do really well in maintaining his sobriety from alcohol.  We will continue acamprosate 666 mg.  Encouraged at least twice daily dosing.  He is tolerating the medication well and " denies any adverse effects.  Will call with any issues.    -Advisement to take part in and remain active in recovery meetings and to establish/maintain an active relationship with a recovery sponsor.  Doing well in engaging in different types of recovery related modalities daily.  Has a strong sober support network.  -Coping with REBEKAH and ADHD well without any pharmacological intervention.  Will call or RTC with any issues.  -Will reach out to peer support as needed    Assessment & Plan   Problems Addressed this Visit    None  Visit Diagnoses       Alcohol use disorder, severe, in early remission        Relevant Medications    acamprosate (CAMPRAL) 333 MG EC tablet          Diagnoses         Codes Comments    Alcohol use disorder, severe, in early remission     ICD-10-CM: F10.21  ICD-9-CM: 303.93           PLAN:  Safety: No acute safety concerns  Risk Assessment: Risk of self-harm acutely is low. Risk of self-harm chronically is also low, but could be further elevated in the event of treatment noncompliance and/or AODA.      TREATMENT PLAN/GOALS: Continue supportive psychotherapy efforts and medications as indicated. Treatment and medication options discussed during today's visit. Patient acknowledged and verbally consented to continue with current treatment plan and was educated on the importance of compliance with treatment and follow-up appointments.      MEDICATION ISSUES:  BREANNA reviewed as expected.  Discussed medication options and treatment plan of prescribed medication as well as the risks, benefits, and side effects including potential falls, possible impaired driving and metabolic adversities among others. Patient is agreeable to call the office with any worsening of symptoms or onset of side effects. Patient is agreeable to call 911 or go to the nearest ER should he/she begin having SI/HI. No medication side effects or related complaints today.     MEDS ORDERED DURING VISIT:  New Medications Ordered This  Visit   Medications    acamprosate (CAMPRAL) 333 MG EC tablet     Sig: Take 2 tablets by mouth 2 (Two) Times a Day.     Dispense:  120 tablet     Refill:  2       Return in about 3 months (around 10/1/2024) for Telehealth Visit.           This document has been electronically signed by EMILY Hernandez  July 2, 2024 15:18 EDT      Part of this note may be an electronic transcription/translation of spoken language to printed text using the Dragon Dictation System.

## 2024-07-12 DIAGNOSIS — F10.21 ALCOHOL USE DISORDER, SEVERE, IN EARLY REMISSION: ICD-10-CM

## 2024-07-12 RX ORDER — ACAMPROSATE CALCIUM 333 MG/1
TABLET, DELAYED RELEASE ORAL
Qty: 90 TABLET | OUTPATIENT
Start: 2024-07-12

## 2024-10-01 ENCOUNTER — TELEMEDICINE (OUTPATIENT)
Dept: PSYCHIATRY | Facility: CLINIC | Age: 24
End: 2024-10-01
Payer: COMMERCIAL

## 2024-10-01 DIAGNOSIS — F10.21 ALCOHOL USE DISORDER, SEVERE, IN SUSTAINED REMISSION: Primary | ICD-10-CM

## 2024-10-01 NOTE — PROGRESS NOTES
Telehealth Visit      This provider is located at [facility], Behavioral Health, [address] [facility] in [place], [place],using a secure [employer] (IMMI)hart Video Visit through [employer]. Patient is being seen remotely via telehealth at their home address in [place], and stated they are in a secure environment for this session. The patient's condition being diagnosed/treated is appropriate for telemedicine. The provider identified herself as well as her credentials. The patient, and/or patients guardian, consent to be seen remotely, and when consent is given they understand that the consent allows for patient identifiable information to be sent to a third party as needed. They may refuse to be seen remotely at any time. The electronic data is encrypted and password protected, and the patient and/or guardian has been advised of the potential risks to privacy not withstanding such measures.    Patient Name: Gerald Ge  : 2000   MRN: [id number]     Referring Provider: Vega Nielsen DO    Chief Complaint: Follow up on substance use    History of Present Illness:   Gerald Ge is a 24 y.o. male who is here today for follow up related to alcohol use.  He continues to do well in his sobriety and is celebrating over 400 days sober. Continues taking acamprosate 666 mg once daily and feels this is adequate. Considering further tapering. Denies any cravings for alcohol. Not having any adverse effects.  Not attending any recovery meetings but has a strong sober support network he reaches out to.  Continues to report mood is good overall.  Sleeping well at night.  Denies any SI/HI, AVH.  No other complaints today.    Triggers: stress, contact with alcohol, seeing it on TV, hearing other talking about drinking    Cravings: Denies any recent cravings    Relapse Prevention: MAT for AUD, peer support, recovery meetings cass CARLOS (PDMCHETAN) Reviewed for Current/Active Medications: No active  medication    Past Surgical History:  Past Surgical History:   Procedure Laterality Date    TONSILLECTOMY         Problem List:  Patient Active Problem List   Diagnosis    Acne vulgaris    Attention deficit disorder (ADD) in adult    Reactive airway disease       Allergy:   Allergies   Allergen Reactions    Wellbutrin [Bupropion] Mental Status Change        Current Medications:   Current Outpatient Medications   Medication Sig Dispense Refill    acamprosate (CAMPRAL) 333 MG EC tablet Take 2 tablets by mouth 2 (Two) Times a Day. 120 tablet 2     No current facility-administered medications for this visit.       Past Medical History:  Past Medical History:   Diagnosis Date    ADHD (attention deficit hyperactivity disorder)        Social History:  Social History     Socioeconomic History    Marital status: Single   Tobacco Use    Smoking status: Former     Current packs/day: 0.00     Average packs/day: 0.3 packs/day for 0.5 years (0.1 ttl pk-yrs)     Types: Cigarettes     Start date: 2023     Quit date: 2023     Years since quittin.2     Passive exposure: Past    Smokeless tobacco: Never    Tobacco comments:     Currently using Nicotine Pouches-per Pt. 24   Vaping Use    Vaping status: Former   Substance and Sexual Activity    Alcohol use: Not Currently     Comment: occasionally, states he has a history of alcoholism    Drug use: Not Currently     Types: Marijuana    Sexual activity: Defer       Family History:  Family History   Adopted: Yes   Problem Relation Age of Onset    No Known Problems Mother     No Known Problems Father          Subjective      Review of Systems:   Review of Systems   Constitutional:  Positive for fatigue. Negative for chills and fever.   Respiratory:  Negative for shortness of breath.    Cardiovascular:  Negative for chest pain.   Gastrointestinal:  Negative for abdominal pain.   Skin:  Negative for skin lesions.   Neurological:  Negative for seizures and confusion.    Psychiatric/Behavioral:  Positive for stress. Negative for hallucinations, sleep disturbance, suicidal ideas and depressed mood. The patient is not nervous/anxious.        PHQ-9 Depression Screening  Little interest or pleasure in doing things? (P) 0-->not at all   Feeling down, depressed, or hopeless? (P) 0-->not at all   Trouble falling or staying asleep, or sleeping too much? (P) 0-->not at all   Feeling tired or having little energy? (P) 2-->more than half the days   Poor appetite or overeating? (P) 0-->not at all   Feeling bad about yourself - or that you are a failure or have let yourself or your family down? (P) 0-->not at all   Trouble concentrating on things, such as reading the newspaper or watching television? (P) 0-->not at all   Moving or speaking so slowly that other people could have noticed? Or the opposite - being so fidgety or restless that you have been moving around a lot more than usual? (P) 0-->not at all   Thoughts that you would be better off dead, or of hurting yourself in some way? (P) 0-->not at all   PHQ-9 Total Score (P) 2   If you checked off any problems, how difficult have these problems made it for you to do your work, take care of things at home, or get along with other people? (P) not difficult at all     REBEKAH-7 Score:   Feeling nervous, anxious or on edge: (P) Not at all  Not being able to stop or control worrying: (P) Not at all  Worrying too much about different things: (P) Not at all  Trouble Relaxing: (P) Not at all  Being so restless that it is hard to sit still: (P) More than half the days  Feeling afraid as if something awful might happen: (P) Not at all  Becoming easily annoyed or irritable: (P) More than half the days  REBEKAH 7 Total Score: (P) 4  If you checked any problems, how difficult have these problems made it for you to do your work, take care of things at home, or get along with other people: (P) Not difficult at all    Patient History:   The following portions of  the patient's history were reviewed and updated as appropriate: allergies, current medications, past family history, past medical history, past social history, past surgical history and problem list.     Social:  Social History     Socioeconomic History    Marital status: Single   Tobacco Use    Smoking status: Former     Current packs/day: 0.00     Average packs/day: 0.3 packs/day for 0.5 years (0.1 ttl pk-yrs)     Types: Cigarettes     Start date: 2023     Quit date: 2023     Years since quittin.2     Passive exposure: Past    Smokeless tobacco: Never    Tobacco comments:     Currently using Nicotine Pouches-per Pt. 24   Vaping Use    Vaping status: Former   Substance and Sexual Activity    Alcohol use: Not Currently     Comment: occasionally, states he has a history of alcoholism    Drug use: Not Currently     Types: Marijuana    Sexual activity: Defer       Medications:     Current Outpatient Medications:     acamprosate (CAMPRAL) 333 MG EC tablet, Take 2 tablets by mouth 2 (Two) Times a Day., Disp: 120 tablet, Rfl: 2    Objective     Physical Exam  Vitals reviewed.   Constitutional:       General: He is not in acute distress.     Appearance: He is well-developed. He is not ill-appearing.   Pulmonary:      Effort: No respiratory distress.   Neurological:      Mental Status: He is alert and oriented to person, place, and time.   Psychiatric:         Attention and Perception: Attention normal.         Mood and Affect: Mood and affect normal.         Speech: Speech normal.         Behavior: Behavior normal. Behavior is cooperative.         Thought Content: Thought content is not paranoid or delusional. Thought content does not include homicidal or suicidal ideation. Thought content does not include homicidal or suicidal plan.         Cognition and Memory: Cognition and memory normal.         Judgment: Judgment normal.         Vital Signs: There were no vitals filed for this visit.  There is no height  or weight on file to calculate BMI.     Mental Status Exam:   Hygiene:   good  Cooperation:  Cooperative  Eye Contact:  Good  Psychomotor Behavior:  Appropriate  Affect:  Full range  Mood: normal  Speech:  Normal  Thought Process:  Goal directed  Thought Content:  Normal  Suicidal:  None  Homicidal:  None  Hallucinations:  None  Delusion:  None  Memory:  Intact  Orientation:  Person, Place, Time, and Situation  Reliability:  good  Insight:  Good  Judgement:  Good  Impulse Control:  Good    Assessment / Plan    -Gerald continues to do really well in maintaining his sobriety from alcohol.  We will continue acamprosate 666 mg daily. Considering tapering. Discussed he can drop to 333mg daily for a couple weeks to see how he does then stop, if tolerated.  He is going to discuss with his family.  If he decides to stay on the medication he will come in to have renal function drawn.   Call for refill.  -Advisement to take part in and remain active in recovery meetings and to establish/maintain an active relationship with a recovery sponsor.  Doing well in engaging in different types of recovery related modalities as he has availability.  Has a strong sober support network.  -Coping with REBEKAH and ADHD well without any pharmacological intervention.  Will call or RTC with any issues.  -Will reach out to peer support as needed    Assessment & Plan   Problems Addressed this Visit    None  Visit Diagnoses       Alcohol use disorder, severe, in sustained remission    -  Primary          Diagnoses         Codes Comments    Alcohol use disorder, severe, in sustained remission    -  Primary ICD-10-CM: F10.21  ICD-9-CM: 303.93             Visit Diagnoses:    ICD-10-CM ICD-9-CM   1. Alcohol use disorder, severe, in sustained remission  F10.21 303.93       PLAN:  Safety: No acute safety concerns  Risk Assessment: Risk of self-harm acutely is low. Risk of self-harm chronically is also low, but could be further elevated in the event of  treatment noncompliance and/or AODA.      TREATMENT PLAN: Continue supportive psychotherapy efforts and medications as indicated. Treatment and medication options discussed during today's visit. Patient acknowledged and verbally consented to continue with current treatment plan and was educated on the importance of compliance with treatment and follow-up appointments.    GOALS:  Short Term Goals: Patient will be compliant with medication, and patient will have no significant medication related side effects.  Patient will be engaged in psychotherapy as indicated.  Patient will report subjective improvement of symptoms.  Long term goals: To stabilize mood and treat/improve subjective symptoms, the patient will stay out of the hospital, the patient will be at an optimal level of functioning, and the patient will take all medications as prescribed.  The patient/guardian verbalized understanding and agreement with goals that were mutually set.      MEDICATION ISSUES:  BREANNA reviewed as expected.  Discussed medication options and treatment plan of prescribed medication as well as the risks, benefits, and side effects including potential falls, possible impaired driving and metabolic adversities among others. Patient is agreeable to call the office with any worsening of symptoms or onset of side effects. Patient is agreeable to call 911 or go to the nearest ER should he/she begin having SI/HI. No medication side effects or related complaints today.     MEDS ORDERED DURING VISIT:  No orders of the defined types were placed in this encounter.      Return in about 3 months (around 1/1/2025), or if symptoms worsen or fail to improve, for Follow Up Medication.               This document has been electronically signed by EMILY Hernandez  October 1, 2024 11:58 EDT      Part of this note may be an electronic transcription/translation of spoken language to printed text using the Dragon Dictation System.

## 2024-10-14 ENCOUNTER — LAB (OUTPATIENT)
Dept: LAB | Facility: HOSPITAL | Age: 24
End: 2024-10-14
Payer: COMMERCIAL

## 2024-10-14 DIAGNOSIS — F10.21 ALCOHOL USE DISORDER, SEVERE, IN EARLY REMISSION: ICD-10-CM

## 2024-10-14 LAB
AMPHET+METHAMPHET UR QL: NEGATIVE
AMPHETAMINES UR QL: NEGATIVE
BARBITURATES UR QL SCN: NEGATIVE
BENZODIAZ UR QL SCN: NEGATIVE
BUPRENORPHINE SERPL-MCNC: NEGATIVE NG/ML
CANNABINOIDS SERPL QL: POSITIVE
COCAINE UR QL: NEGATIVE
METHADONE UR QL SCN: NEGATIVE
OPIATES UR QL: NEGATIVE
OXYCODONE UR QL SCN: NEGATIVE
PCP UR QL SCN: NEGATIVE
TRICYCLICS UR QL SCN: NEGATIVE

## 2024-10-14 PROCEDURE — 80306 DRUG TEST PRSMV INSTRMNT: CPT

## 2024-10-21 LAB — REF LAB TEST METHOD: NORMAL

## 2025-01-09 ENCOUNTER — TELEMEDICINE (OUTPATIENT)
Dept: PSYCHIATRY | Facility: CLINIC | Age: 25
End: 2025-01-09
Payer: COMMERCIAL

## 2025-01-09 DIAGNOSIS — F10.21 ALCOHOL USE DISORDER, SEVERE, IN SUSTAINED REMISSION: ICD-10-CM

## 2025-01-09 NOTE — PROGRESS NOTES
Telehealth Visit      This provider is located at The [facility], Behavioral Health, [address] [facility] in [place], [place],using a secure [employer] Video Visit through [employer]. Patient is being seen remotely via telehealth at their home address in [place], and stated they are in a secure environment for this session. The patient's condition being diagnosed/treated is appropriate for telemedicine. The provider identified herself as well as her credentials. The patient, and/or patients guardian, consent to be seen remotely, and when consent is given they understand that the consent allows for patient identifiable information to be sent to a third party as needed. They may refuse to be seen remotely at any time. The electronic data is encrypted and password protected, and the patient and/or guardian has been advised of the potential risks to privacy not withstanding such measures.    Patient Name: Gerald Ge  : 2000   MRN: [id number]     Referring Provider: Vega Nielsen DO    Chief Complaint: Follow up on substance use    History of Present Illness:   Gerald Ge is a 24 y.o. male who is here today for follow up related to alcohol use.  Continues taking acamprosate 666 mg once daily and feels this is adequate.  Over a year sober.  Stopped taking for about 2 weeks and did not notice a difference.  Did start using CBD tincture for a couple weeks for sleep.  Family thought this was concerning so he went back on his acamprosate.  Denies any cravings for alcohol. Not having any adverse effects.  Not attending any recovery meetings but has a strong sober support network he reaches out to.  Continues to report mood is good overall.  Denies any SI/HI, AVH.  No other complaints today.    Triggers: stress, contact with alcohol, seeing it on TV, hearing other talking about drinking    Cravings: Denies any recent cravings    Relapse Prevention: MAT for AUD, peer support, recovery  meetings cass CARLOS (PDMP) Reviewed for Current/Active Medications: No active medication    Past Surgical History:  Past Surgical History:   Procedure Laterality Date    TONSILLECTOMY         Problem List:  Patient Active Problem List   Diagnosis    Acne vulgaris    Attention deficit disorder (ADD) in adult    Reactive airway disease       Allergy:   Allergies   Allergen Reactions    Wellbutrin [Bupropion] Mental Status Change        Current Medications:   Current Outpatient Medications   Medication Sig Dispense Refill    acamprosate (CAMPRAL) 333 MG EC tablet Take 2 tablets by mouth 2 (Two) Times a Day. 120 tablet 2     No current facility-administered medications for this visit.       Past Medical History:  Past Medical History:   Diagnosis Date    ADHD (attention deficit hyperactivity disorder)        Social History:  Social History     Socioeconomic History    Marital status: Single   Tobacco Use    Smoking status: Former     Current packs/day: 0.00     Average packs/day: 0.3 packs/day for 0.5 years (0.1 ttl pk-yrs)     Types: Cigarettes     Start date: 2023     Quit date: 2023     Years since quittin.5     Passive exposure: Past    Smokeless tobacco: Never    Tobacco comments:     Currently using Nicotine Pouches-per Pt. 24   Vaping Use    Vaping status: Former   Substance and Sexual Activity    Alcohol use: Not Currently     Comment: occasionally, states he has a history of alcoholism    Drug use: Not Currently     Types: Marijuana    Sexual activity: Defer       Family History:  Family History   Adopted: Yes   Problem Relation Age of Onset    No Known Problems Mother     No Known Problems Father          Subjective      Review of Systems:   Review of Systems   Constitutional:  Negative for chills, fatigue and fever.   Respiratory:  Negative for shortness of breath.    Cardiovascular:  Negative for chest pain.   Gastrointestinal:  Negative for abdominal pain.   Skin:  Negative for skin  lesions.   Neurological:  Negative for seizures and confusion.   Psychiatric/Behavioral:  Positive for stress. Negative for hallucinations, sleep disturbance, suicidal ideas and depressed mood. The patient is not nervous/anxious.      PHQ-9 Depression Screening  Little interest or pleasure in doing things? (Patient-Rptd) Not at all   Feeling down, depressed, or hopeless? (Patient-Rptd) Not at all   PHQ-2 Total Score (Patient-Rptd) 0   Trouble falling or staying asleep, or sleeping too much? (Patient-Rptd) Several days   Feeling tired or having little energy? (Patient-Rptd) Several days   Poor appetite or overeating? (Patient-Rptd) Not at all   Feeling bad about yourself - or that you are a failure or have let yourself or your family down? (Patient-Rptd) Not at all   Trouble concentrating on things, such as reading the newspaper or watching television? (Patient-Rptd) Not at all   Moving or speaking so slowly that other people could have noticed? Or the opposite - being so fidgety or restless that you have been moving around a lot more than usual? (Patient-Rptd) Not at all   Thoughts that you would be better off dead, or of hurting yourself in some way? (Patient-Rptd) Not at all   PHQ-9 Total Score (Patient-Rptd) 2   If you checked off any problems, how difficult have these problems made it for you to do your work, take care of things at home, or get along with other people? (Patient-Rptd) Not difficult at all       REBEKAH-7 Score:   Feeling nervous, anxious or on edge: (Patient-Rptd) Not at all  Not being able to stop or control worrying: (Patient-Rptd) Several days  Worrying too much about different things: (Patient-Rptd) Several days  Trouble Relaxing: (Patient-Rptd) Not at all  Being so restless that it is hard to sit still: (Patient-Rptd) Not at all  Feeling afraid as if something awful might happen: (Patient-Rptd) Not at all  Becoming easily annoyed or irritable: (Patient-Rptd) Several days  REBEKAH 7 Total Score:  (Patient-Rptd) 3  If you checked any problems, how difficult have these problems made it for you to do your work, take care of things at home, or get along with other people: (Patient-Rptd) Not difficult at all    Patient History:   The following portions of the patient's history were reviewed and updated as appropriate: allergies, current medications, past family history, past medical history, past social history, past surgical history and problem list.     Social:  Social History     Socioeconomic History    Marital status: Single   Tobacco Use    Smoking status: Former     Current packs/day: 0.00     Average packs/day: 0.3 packs/day for 0.5 years (0.1 ttl pk-yrs)     Types: Cigarettes     Start date: 2023     Quit date: 2023     Years since quittin.5     Passive exposure: Past    Smokeless tobacco: Never    Tobacco comments:     Currently using Nicotine Pouches-per Pt. 24   Vaping Use    Vaping status: Former   Substance and Sexual Activity    Alcohol use: Not Currently     Comment: occasionally, states he has a history of alcoholism    Drug use: Not Currently     Types: Marijuana    Sexual activity: Defer       Medications:     Current Outpatient Medications:     acamprosate (CAMPRAL) 333 MG EC tablet, Take 2 tablets by mouth 2 (Two) Times a Day., Disp: 120 tablet, Rfl: 2    Objective     Physical Exam  Vitals reviewed.   Constitutional:       General: He is not in acute distress.     Appearance: He is well-developed. He is not ill-appearing.   Pulmonary:      Effort: No respiratory distress.   Neurological:      Mental Status: He is alert and oriented to person, place, and time.   Psychiatric:         Attention and Perception: Attention normal.         Mood and Affect: Mood and affect normal.         Speech: Speech normal.         Behavior: Behavior normal. Behavior is cooperative.         Thought Content: Thought content is not paranoid or delusional. Thought content does not include homicidal or  suicidal ideation. Thought content does not include homicidal or suicidal plan.         Cognition and Memory: Cognition and memory normal.         Judgment: Judgment normal.         Vital Signs: There were no vitals filed for this visit.  There is no height or weight on file to calculate BMI.     Mental Status Exam: Reviewed 1/9/2025  Hygiene:   good  Cooperation:  Cooperative  Eye Contact:  Good  Psychomotor Behavior:  Appropriate  Affect:  Full range  Mood: normal  Speech:  Normal  Thought Process:  Goal directed  Thought Content:  Normal  Suicidal:  None  Homicidal:  None  Hallucinations:  None  Delusion:  None  Memory:  Intact  Orientation:  Person, Place, Time, and Situation  Reliability:  good  Insight:  Good  Judgement:  Good  Impulse Control:  Good    Assessment / Plan    -Gerald continues to do really well in maintaining his sobriety from alcohol.  We will continue acamprosate 666 mg daily.  Tapered off once and did well for 2 weeks.  Family thought he should go back on due to CBD use.  No further CBD use since that time.  Considering tapering again. Discussed he can drop to 333mg daily for a couple weeks to see how he does then stop, if tolerated.  He is going to discuss with his family. Call for refill.  -Advisement to take part in and remain active in recovery meetings and to establish/maintain an active relationship with a recovery sponsor.  Doing well in engaging in different types of recovery related modalities as he has availability.  Has a strong sober support network.  -Coping with REBEKAH and ADHD well without any pharmacological intervention.  Will call or RTC with any issues.  Has had some trouble falling asleep.  Declines pharmacological intervention.  -Will reach out to peer support as needed  -Follow up in 3 months per patient preference    Assessment & Plan   Problems Addressed this Visit    None  Visit Diagnoses       Alcohol use disorder, severe, in sustained remission              Diagnoses          Codes Comments    Alcohol use disorder, severe, in sustained remission     ICD-10-CM: F10.21  ICD-9-CM: 303.93             Visit Diagnoses:    ICD-10-CM ICD-9-CM   1. Alcohol use disorder, severe, in sustained remission  F10.21 303.93         PLAN:  Safety: No acute safety concerns  Risk Assessment: Risk of self-harm acutely is low. Risk of self-harm chronically is also low, but could be further elevated in the event of treatment noncompliance and/or AODA.      TREATMENT PLAN: Continue supportive psychotherapy efforts and medications as indicated. Treatment and medication options discussed during today's visit. Patient acknowledged and verbally consented to continue with current treatment plan and was educated on the importance of compliance with treatment and follow-up appointments.    GOALS:  Short Term Goals: Patient will be compliant with medication, and patient will have no significant medication related side effects.  Patient will be engaged in psychotherapy as indicated.  Patient will report subjective improvement of symptoms.  Long term goals: To stabilize mood and treat/improve subjective symptoms, the patient will stay out of the hospital, the patient will be at an optimal level of functioning, and the patient will take all medications as prescribed.  The patient/guardian verbalized understanding and agreement with goals that were mutually set.      MEDICATION ISSUES:  BREANNA reviewed as expected.  Discussed medication options and treatment plan of prescribed medication as well as the risks, benefits, and side effects including potential falls, possible impaired driving and metabolic adversities among others. Patient is agreeable to call the office with any worsening of symptoms or onset of side effects. Patient is agreeable to call 911 or go to the nearest ER should he/she begin having SI/HI. No medication side effects or related complaints today.     MEDS ORDERED DURING VISIT:  No orders of the  defined types were placed in this encounter.      Return in about 3 months (around 4/9/2025) for Follow Up Medication, Telehealth Visit.               This document has been electronically signed by EMILY Hernandez  January 9, 2025 13:54 EST      Part of this note may be an electronic transcription/translation of spoken language to printed text using the Dragon Dictation System.

## 2025-04-15 ENCOUNTER — TELEPHONE (OUTPATIENT)
Dept: PSYCHIATRY | Facility: CLINIC | Age: 25
End: 2025-04-15

## 2025-04-15 NOTE — TELEPHONE ENCOUNTER
Gerald called and rescheduled his appointment for 4/29/25. He wanted to apologize for cancelling today.

## 2025-04-29 ENCOUNTER — OFFICE VISIT (OUTPATIENT)
Dept: PSYCHIATRY | Facility: CLINIC | Age: 25
End: 2025-04-29
Payer: COMMERCIAL

## 2025-04-29 VITALS
DIASTOLIC BLOOD PRESSURE: 70 MMHG | HEART RATE: 78 BPM | SYSTOLIC BLOOD PRESSURE: 132 MMHG | BODY MASS INDEX: 23.86 KG/M2 | WEIGHT: 152 LBS | OXYGEN SATURATION: 100 % | HEIGHT: 67 IN

## 2025-04-29 DIAGNOSIS — F10.21 ALCOHOL USE DISORDER, SEVERE, IN SUSTAINED REMISSION: Primary | ICD-10-CM

## 2025-04-29 NOTE — PROGRESS NOTES
Telehealth Visit        Patient Name: Gerald Ge  : 2000   MRN: [id number]     Referring Provider: Vega Nielsen DO    Chief Complaint: Follow up on substance use    History of Present Illness:   Gerald Ge is a 25 y.o. male who is here today for follow up related to alcohol use.  Stopped taking acamprosate a couple months ago and has done well. Will be sober 2 years in . Not having any cravings, dreams, or fleeting thoughts about drinking. Has talked through it with his family.  Not attending any recovery meetings but has a strong sober support network. Continues to report mood is good overall. Has some stressors at home but is coping well.  Denies any SI/HI, AVH.  No other complaints today.    Triggers: stress, contact with alcohol, seeing it on TV, hearing other talking about drinking    Cravings: Denies any recent cravings    Relapse Prevention: peer support, recovery meetings encouraged    BREANNA (PDMP) Reviewed for Current/Active Medications: No active medication    Past Surgical History:  Past Surgical History:   Procedure Laterality Date    TONSILLECTOMY         Problem List:  Patient Active Problem List   Diagnosis    Acne vulgaris    Attention deficit disorder (ADD) in adult    Reactive airway disease       Allergy:   Allergies   Allergen Reactions    Wellbutrin [Bupropion] Mental Status Change        Current Medications:   No current outpatient medications on file.     No current facility-administered medications for this visit.       Past Medical History:  Past Medical History:   Diagnosis Date    ADHD (attention deficit hyperactivity disorder)        Social History:  Social History     Socioeconomic History    Marital status: Single   Tobacco Use    Smoking status: Former     Current packs/day: 0.00     Average packs/day: 0.3 packs/day for 0.5 years (0.1 ttl pk-yrs)     Types: Cigarettes     Start date: 2023     Quit date: 2023     Years since quittin.8     Passive  exposure: Past    Smokeless tobacco: Never    Tobacco comments:     Currently using Nicotine Pouches-per Pt. 7/1/24   Vaping Use    Vaping status: Former   Substance and Sexual Activity    Alcohol use: Not Currently     Comment: occasionally, states he has a history of alcoholism    Drug use: Not Currently     Types: Marijuana    Sexual activity: Defer       Family History:  Family History   Adopted: Yes   Problem Relation Age of Onset    No Known Problems Mother     No Known Problems Father          Subjective      Review of Systems:   Review of Systems   Constitutional:  Negative for chills, fatigue and fever.   Respiratory:  Negative for shortness of breath.    Cardiovascular:  Negative for chest pain.   Gastrointestinal:  Negative for abdominal pain.   Skin:  Negative for skin lesions.   Neurological:  Negative for seizures and confusion.   Psychiatric/Behavioral:  Positive for stress. Negative for hallucinations, sleep disturbance, suicidal ideas and depressed mood. The patient is not nervous/anxious.      PHQ-9 Depression Screening  Little interest or pleasure in doing things? Not at all   Feeling down, depressed, or hopeless? Not at all   PHQ-2 Total Score 0   Trouble falling or staying asleep, or sleeping too much? Not at all   Feeling tired or having little energy? Not at all   Poor appetite or overeating? Not at all   Feeling bad about yourself - or that you are a failure or have let yourself or your family down? Not at all   Trouble concentrating on things, such as reading the newspaper or watching television? Not at all   Moving or speaking so slowly that other people could have noticed? Or the opposite - being so fidgety or restless that you have been moving around a lot more than usual? Not at all   Thoughts that you would be better off dead, or of hurting yourself in some way? Not at all   PHQ-9 Total Score 0   If you checked off any problems, how difficult have these problems made it for you to do  your work, take care of things at home, or get along with other people? Not difficult at all       REBEKAH-7 Score:   Feeling nervous, anxious or on edge: Not at all  Not being able to stop or control worrying: Not at all  Worrying too much about different things: Not at all  Trouble Relaxing: Not at all  Being so restless that it is hard to sit still: Not at all  Feeling afraid as if something awful might happen: Not at all  Becoming easily annoyed or irritable: Several days  REBEKAH 7 Total Score: 1  If you checked any problems, how difficult have these problems made it for you to do your work, take care of things at home, or get along with other people: Not difficult at all    Patient History:   The following portions of the patient's history were reviewed and updated as appropriate: allergies, current medications, past family history, past medical history, past social history, past surgical history and problem list.     Social:  Social History     Socioeconomic History    Marital status: Single   Tobacco Use    Smoking status: Former     Current packs/day: 0.00     Average packs/day: 0.3 packs/day for 0.5 years (0.1 ttl pk-yrs)     Types: Cigarettes     Start date: 2023     Quit date: 2023     Years since quittin.8     Passive exposure: Past    Smokeless tobacco: Never    Tobacco comments:     Currently using Nicotine Pouches-per Pt. 24   Vaping Use    Vaping status: Former   Substance and Sexual Activity    Alcohol use: Not Currently     Comment: occasionally, states he has a history of alcoholism    Drug use: Not Currently     Types: Marijuana    Sexual activity: Defer       Medications:   No current outpatient medications on file.    Objective     Physical Exam  Vitals reviewed.   Constitutional:       General: He is not in acute distress.     Appearance: He is well-developed. He is not ill-appearing.   Pulmonary:      Effort: No respiratory distress.   Neurological:      Mental Status: He is alert and  "oriented to person, place, and time.      Gait: Gait normal.   Psychiatric:         Attention and Perception: Attention normal.         Mood and Affect: Mood and affect normal.         Speech: Speech normal.         Behavior: Behavior normal. Behavior is cooperative.         Thought Content: Thought content is not paranoid or delusional. Thought content does not include homicidal or suicidal ideation. Thought content does not include homicidal or suicidal plan.         Cognition and Memory: Cognition and memory normal.         Judgment: Judgment normal.         Vital Signs:   Vitals:    04/29/25 1111   BP: 132/70   Pulse: 78   SpO2: 100%   Weight: 68.9 kg (152 lb)   Height: 170.2 cm (67\")     Body mass index is 23.81 kg/m².     Mental Status Exam: Reviewed 4/29/2025  Hygiene:   good  Cooperation:  Cooperative  Eye Contact:  Good  Psychomotor Behavior:  Appropriate  Affect:  Full range  Mood: normal  Speech:  Normal  Thought Process:  Goal directed  Thought Content:  Normal  Suicidal:  None  Homicidal:  None  Hallucinations:  None  Delusion:  None  Memory:  Intact  Orientation:  Person, Place, Time, and Situation  Reliability:  good  Insight:  Good  Judgement:  Good  Impulse Control:  Good    Assessment / Plan    -Gerald continues to do really well in maintaining his sobriety from alcohol. Discontinue acamprosate 666 mg daily as he has already stopped taking. Doing well.   -Advisement to take part in and remain active in recovery meetings and to establish/maintain an active relationship with a recovery sponsor.  Doing well in engaging in different types of recovery related modalities as he has availability.  Has a strong sober support network.  -Coping with REBEKAH and ADHD well without any pharmacological intervention.  Will call or RTC with any issues.    -Will reach out to peer support as needed  -Follow up PRN    Assessment & Plan   Problems Addressed this Visit    None  Visit Diagnoses         Alcohol use disorder, " severe, in sustained remission    -  Primary          Diagnoses         Codes Comments      Alcohol use disorder, severe, in sustained remission    -  Primary ICD-10-CM: F10.21  ICD-9-CM: 303.93             Visit Diagnoses:    ICD-10-CM ICD-9-CM   1. Alcohol use disorder, severe, in sustained remission  F10.21 303.93           PLAN:  Safety: No acute safety concerns  Risk Assessment: Risk of self-harm acutely is low. Risk of self-harm chronically is also low, but could be further elevated in the event of treatment noncompliance and/or AODA.      TREATMENT PLAN: Continue supportive psychotherapy efforts and medications as indicated. Treatment and medication options discussed during today's visit. Patient acknowledged and verbally consented to continue with current treatment plan and was educated on the importance of compliance with treatment and follow-up appointments.    GOALS:  Short Term Goals: Patient will be compliant with medication, and patient will have no significant medication related side effects.  Patient will be engaged in psychotherapy as indicated.  Patient will report subjective improvement of symptoms.  Long term goals: To stabilize mood and treat/improve subjective symptoms, the patient will stay out of the hospital, the patient will be at an optimal level of functioning, and the patient will take all medications as prescribed.  The patient/guardian verbalized understanding and agreement with goals that were mutually set.      MEDICATION ISSUES:  BREANNA reviewed as expected.  Discussed medication options and treatment plan of prescribed medication as well as the risks, benefits, and side effects including potential falls, possible impaired driving and metabolic adversities among others. Patient is agreeable to call the office with any worsening of symptoms or onset of side effects. Patient is agreeable to call 911 or go to the nearest ER should he/she begin having SI/HI. No medication side effects  or related complaints today.     MEDS ORDERED DURING VISIT:  No orders of the defined types were placed in this encounter.      Return if symptoms worsen or fail to improve.               This document has been electronically signed by EMILY Hernandez  April 29, 2025 12:16 EDT      Part of this note may be an electronic transcription/translation of spoken language to printed text using the Dragon Dictation System.